# Patient Record
Sex: FEMALE | Race: WHITE | NOT HISPANIC OR LATINO | URBAN - NONMETROPOLITAN AREA
[De-identification: names, ages, dates, MRNs, and addresses within clinical notes are randomized per-mention and may not be internally consistent; named-entity substitution may affect disease eponyms.]

---

## 2017-09-20 ENCOUNTER — OFFICE VISIT (OUTPATIENT)
Dept: SURGERY | Facility: CLINIC | Age: 42
End: 2017-09-20

## 2017-09-20 VITALS
DIASTOLIC BLOOD PRESSURE: 70 MMHG | BODY MASS INDEX: 23.3 KG/M2 | HEART RATE: 82 BPM | SYSTOLIC BLOOD PRESSURE: 120 MMHG | HEIGHT: 66 IN | WEIGHT: 145 LBS

## 2017-09-20 DIAGNOSIS — R59.1 LYMPHADENOPATHY: Primary | ICD-10-CM

## 2017-09-20 PROCEDURE — 99203 OFFICE O/P NEW LOW 30 MIN: CPT | Performed by: SURGERY

## 2017-09-20 RX ORDER — CEPHALEXIN 500 MG/1
CAPSULE ORAL
COMMUNITY
Start: 2017-09-12 | End: 2017-12-18

## 2017-09-21 PROBLEM — R59.1 LYMPHADENOPATHY: Status: ACTIVE | Noted: 2017-09-21

## 2017-09-21 NOTE — PROGRESS NOTES
Subjective   Dali Woodruff is a 42 y.o. female is being seen for consultation today at the request of No ref. provider found    HPI Comments: 42-year-old female with right inguinal lymph node that is enlarged and tender to palpation.  This been present for 2 weeks despite antibiotic therapy.  No wounds or inflammation to account for enlargement.  No type B symptoms reported.  No family history of lymphoma.  On examination there is a 2 cm mildly tender right inguinal lymph node.  No other lymphadenopathy identified.      History reviewed. No pertinent past medical history.    Family History   Problem Relation Age of Onset   • Cancer Brother    • Cancer Maternal Grandmother    • Diabetes Neg Hx    • Heart disease Neg Hx    • Hypertension Neg Hx        Social History     Social History   • Marital status:      Spouse name: N/A   • Number of children: N/A   • Years of education: N/A     Occupational History   • Not on file.     Social History Main Topics   • Smoking status: Never Smoker   • Smokeless tobacco: Never Used   • Alcohol use No   • Drug use: No   • Sexual activity: Defer     Other Topics Concern   • Not on file     Social History Narrative   • No narrative on file       Past Surgical History:   Procedure Laterality Date   •  SECTION     •  SECTION     •  SECTION     • HYSTERECTOMY         The following portions of the patient's history were reviewed and updated as appropriate: allergies, current medications, past family history, past medical history, past social history, past surgical history and problem list.    Review of Systems   Constitutional: Negative for activity change, appetite change, chills and fever.   HENT: Negative for sore throat and trouble swallowing.    Eyes: Negative for visual disturbance.   Respiratory: Negative for cough and shortness of breath.    Cardiovascular: Negative for chest pain and palpitations.   Gastrointestinal: Negative for abdominal  "distention, abdominal pain, blood in stool, constipation, diarrhea, nausea and vomiting.   Endocrine: Negative for cold intolerance and heat intolerance.   Genitourinary: Negative for dysuria.   Musculoskeletal: Negative for joint swelling.   Skin: Negative for color change, rash and wound.   Allergic/Immunologic: Negative for immunocompromised state.   Neurological: Negative for dizziness, seizures, weakness and headaches.   Hematological: Positive for adenopathy. Does not bruise/bleed easily.   Psychiatric/Behavioral: Negative for agitation and confusion.         /70  Pulse 82  Ht 66\" (167.6 cm)  Wt 145 lb (65.8 kg)  LMP  (LMP Unknown)  BMI 23.4 kg/m2  Objective   Physical Exam   Constitutional: She is oriented to person, place, and time. She appears well-developed.   HENT:   Head: Normocephalic and atraumatic.   Mouth/Throat: Mucous membranes are normal.   Eyes: Conjunctivae are normal. Pupils are equal, round, and reactive to light.   Neck: Neck supple. No JVD present. No tracheal deviation present. No thyromegaly present.   Cardiovascular: Normal rate and regular rhythm.  Exam reveals no gallop and no friction rub.    No murmur heard.  Pulmonary/Chest: Effort normal and breath sounds normal.   Abdominal: Soft. She exhibits no distension. There is no splenomegaly or hepatomegaly. There is no tenderness. No hernia.   Musculoskeletal: Normal range of motion. She exhibits no deformity.   Lymphadenopathy:        Right: Inguinal adenopathy present.   Neurological: She is alert and oriented to person, place, and time.   Skin: Skin is warm and dry.   Psychiatric: She has a normal mood and affect.         Dali was seen today for enlarged lymph node of groin region.    Diagnoses and all orders for this visit:    Lymphadenopathy        Assessment     42-year-old female with right inguinal lymphadenopathy.  She had a solitary enlarged right inguinal node that is mildly tender to palpation.  She states this is " decreased in size over the past 24 hours.  She will follow up in one week and if it still enlarged will discuss surgical excision.

## 2017-11-07 DIAGNOSIS — M25.562 LEFT KNEE PAIN, UNSPECIFIED CHRONICITY: Primary | ICD-10-CM

## 2017-11-08 ENCOUNTER — APPOINTMENT (OUTPATIENT)
Dept: GENERAL RADIOLOGY | Facility: HOSPITAL | Age: 42
End: 2017-11-08
Attending: ORTHOPAEDIC SURGERY

## 2017-12-18 ENCOUNTER — OFFICE VISIT (OUTPATIENT)
Dept: RETAIL CLINIC | Facility: CLINIC | Age: 42
End: 2017-12-18

## 2017-12-18 VITALS
HEART RATE: 65 BPM | OXYGEN SATURATION: 98 % | RESPIRATION RATE: 18 BRPM | TEMPERATURE: 98 F | BODY MASS INDEX: 23.4 KG/M2 | WEIGHT: 145 LBS

## 2017-12-18 DIAGNOSIS — R31.9 URINARY TRACT INFECTION WITH HEMATURIA, SITE UNSPECIFIED: Primary | ICD-10-CM

## 2017-12-18 DIAGNOSIS — N39.0 URINARY TRACT INFECTION WITH HEMATURIA, SITE UNSPECIFIED: Primary | ICD-10-CM

## 2017-12-18 LAB
BILIRUB BLD-MCNC: NEGATIVE MG/DL
CLARITY, POC: CLEAR
COLOR UR: YELLOW
GLUCOSE UR STRIP-MCNC: NEGATIVE MG/DL
KETONES UR QL: NEGATIVE
LEUKOCYTE EST, POC: ABNORMAL
NITRITE UR-MCNC: NEGATIVE MG/ML
PH UR: 7.5 [PH] (ref 5–8)
PROT UR STRIP-MCNC: NEGATIVE MG/DL
RBC # UR STRIP: ABNORMAL /UL
SP GR UR: 1.01 (ref 1–1.03)
UROBILINOGEN UR QL: NORMAL

## 2017-12-18 PROCEDURE — 99213 OFFICE O/P EST LOW 20 MIN: CPT | Performed by: NURSE PRACTITIONER

## 2017-12-18 RX ORDER — NITROFURANTOIN 25; 75 MG/1; MG/1
100 CAPSULE ORAL 2 TIMES DAILY
Qty: 14 CAPSULE | Refills: 0 | Status: SHIPPED | OUTPATIENT
Start: 2017-12-18 | End: 2017-12-25

## 2017-12-19 NOTE — PROGRESS NOTES
Subjective   Dali Woodruff is a 42 y.o. female.   Chief Complaint   Patient presents with   • Urinary Tract Infection      HPI Comments: Dysuria, hesitancy, frequency for 2-3 days.  Hx of UTI, but none recently.  Has not seen any blood in urine.  No fever, chills, N/V/D.  Has not taken nay med for symptoms.     Urinary Tract Infection    Associated symptoms include frequency and urgency. Pertinent negatives include no chills, flank pain, hematuria, nausea or vomiting.        The following portions of the patient's history were reviewed and updated as appropriate: allergies, current medications, past family history, past medical history, past social history, past surgical history and problem list.    Current Outpatient Prescriptions:   •  Multiple Vitamin (DAILY VITAMINS PO), Take  by mouth., Disp: , Rfl:   •  nitrofurantoin, macrocrystal-monohydrate, (MACROBID) 100 MG capsule, Take 1 capsule by mouth 2 (Two) Times a Day for 7 days., Disp: 14 capsule, Rfl: 0    Review of Systems   Constitutional: Negative for appetite change, chills, fatigue and fever.   Gastrointestinal: Positive for abdominal pain (Mild suprapubic pain). Negative for diarrhea, nausea and vomiting.   Genitourinary: Positive for dysuria, frequency and urgency. Negative for flank pain, hematuria and vaginal discharge.   Musculoskeletal: Negative for back pain.   Skin: Negative for rash.     Pulse 65  Temp 98 °F (36.7 °C) (Temporal Artery )   Resp 18  Wt 65.8 kg (145 lb)  LMP Comment: Hysterectomy  SpO2 98%  BMI 23.4 kg/m2    Objective   No Known Allergies    Physical Exam   Constitutional: She appears well-developed and well-nourished. She does not appear ill. No distress.   HENT:   Head: Normocephalic and atraumatic.   Mouth/Throat: Oropharynx is clear and moist and mucous membranes are normal.   Cardiovascular: Normal rate and regular rhythm.    Pulmonary/Chest: Breath sounds normal. No respiratory distress.   Abdominal: Soft. Normal  appearance. There is tenderness (mild) in the suprapubic area. There is no CVA tenderness.   Skin: Skin is warm and dry. No rash noted.       Assessment/Plan   Dali was seen today for urinary tract infection.    Diagnoses and all orders for this visit:    Urinary tract infection with hematuria, site unspecified  -     POC Urinalysis Dipstick, Automated    Other orders  -     nitrofurantoin, macrocrystal-monohydrate, (MACROBID) 100 MG capsule; Take 1 capsule by mouth 2 (Two) Times a Day for 7 days.      Results for orders placed or performed in visit on 12/18/17   POC Urinalysis Dipstick, Automated   Result Value Ref Range    Color Yellow Yellow, Straw, Dark Yellow, Yulia    Clarity, UA Clear Clear    Glucose, UA Negative Negative, 1000 mg/dL (3+) mg/dL    Bilirubin Negative Negative    Ketones, UA Negative Negative    Specific Gravity  1.015 1.005 - 1.030    Blood, UA Trace (A) Negative    pH, Urine 7.5 5.0 - 8.0    Protein, POC Negative Negative mg/dL    Urobilinogen, UA Normal Normal    Leukocytes Small (1+) (A) Negative    Nitrite, UA Negative Negative

## 2018-11-07 ENCOUNTER — OFFICE VISIT (OUTPATIENT)
Dept: RETAIL CLINIC | Facility: CLINIC | Age: 43
End: 2018-11-07

## 2018-11-07 VITALS
HEART RATE: 83 BPM | RESPIRATION RATE: 18 BRPM | SYSTOLIC BLOOD PRESSURE: 110 MMHG | BODY MASS INDEX: 23.79 KG/M2 | TEMPERATURE: 99.1 F | DIASTOLIC BLOOD PRESSURE: 66 MMHG | OXYGEN SATURATION: 98 % | WEIGHT: 147.4 LBS

## 2018-11-07 DIAGNOSIS — J02.9 SORE THROAT: Primary | ICD-10-CM

## 2018-11-07 DIAGNOSIS — R39.15 URINARY URGENCY: ICD-10-CM

## 2018-11-07 LAB
BILIRUB BLD-MCNC: NEGATIVE MG/DL
CLARITY, POC: CLEAR
COLOR UR: YELLOW
EXPIRATION DATE: NORMAL
GLUCOSE UR STRIP-MCNC: NEGATIVE MG/DL
INTERNAL CONTROL: NORMAL
KETONES UR QL: ABNORMAL
LEUKOCYTE EST, POC: NEGATIVE
Lab: NORMAL
NITRITE UR-MCNC: NEGATIVE MG/ML
PH UR: 6 [PH] (ref 5–8)
PROT UR STRIP-MCNC: NEGATIVE MG/DL
RBC # UR STRIP: NEGATIVE /UL
S PYO AG THROAT QL: NEGATIVE
SP GR UR: 1.02 (ref 1–1.03)
UROBILINOGEN UR QL: NORMAL

## 2018-11-07 PROCEDURE — 87880 STREP A ASSAY W/OPTIC: CPT | Performed by: NURSE PRACTITIONER

## 2018-11-07 PROCEDURE — 99213 OFFICE O/P EST LOW 20 MIN: CPT | Performed by: NURSE PRACTITIONER

## 2018-11-07 RX ORDER — FLUTICASONE PROPIONATE 50 MCG
2 SPRAY, SUSPENSION (ML) NASAL DAILY
Qty: 1 BOTTLE | Refills: 0 | Status: SHIPPED | OUTPATIENT
Start: 2018-11-07 | End: 2020-01-17

## 2018-11-07 RX ORDER — PHENAZOPYRIDINE HYDROCHLORIDE 200 MG/1
200 TABLET, FILM COATED ORAL 3 TIMES DAILY PRN
Qty: 9 TABLET | Refills: 0 | Status: SHIPPED | OUTPATIENT
Start: 2018-11-07 | End: 2019-04-06

## 2018-11-07 NOTE — PROGRESS NOTES
"ZOILAGIN@  Dali Woodruff is a 43 y.o. female.   Chief Complaint   Patient presents with   • Sore Throat     Possible Strep   • Urinary Tract Infection      Sore Throat    This is a new problem. Episode onset: past few days. The problem has been waxing and waning. There has been no fever. Associated symptoms include a plugged ear sensation (congestion). Pertinent negatives include no coughing, diarrhea, ear discharge, ear pain, neck pain, shortness of breath, swollen glands, trouble swallowing or vomiting. She has had exposure to strep. She has tried nothing for the symptoms.   Urinary Tract Infection    Associated symptoms include frequency and urgency. Pertinent negatives include no chills, discharge, nausea, possible pregnancy or vomiting.      Presents with c/o of \"UTI symptoms\" last week which have improved some. But continues to have c/o of urinary urgency. She denies any dysuria today. Has a PMH of frequent UTI with last episode approximately 4 months ago.     The following portions of the patient's history were reviewed and updated as appropriate: allergies, current medications, past family history, past medical history, past social history, past surgical history and problem list.    Current Outpatient Prescriptions:   •  fluticasone (FLONASE) 50 MCG/ACT nasal spray, 2 sprays into the nostril(s) as directed by provider Daily., Disp: 1 bottle, Rfl: 0  •  loratadine-pseudoephedrine (CLARITIN-D 12 HOUR) 5-120 MG per 12 hr tablet, Take 1 tablet by mouth 2 (Two) Times a Day. PRN as needed, Disp: 14 tablet, Rfl: 0  •  Multiple Vitamin (DAILY VITAMINS PO), Take  by mouth., Disp: , Rfl:   •  phenazopyridine (PYRIDIUM) 200 MG tablet, Take 1 tablet by mouth 3 (Three) Times a Day As Needed for bladder spasms., Disp: 9 tablet, Rfl: 0    No Known Allergies    Review of Systems   Constitutional: Negative for appetite change, chills, fatigue and fever.   HENT: Positive for sore throat and voice change. Negative for " ear discharge, ear pain, postnasal drip, rhinorrhea and trouble swallowing.    Eyes: Negative for discharge.   Respiratory: Negative for cough, shortness of breath and wheezing.    Gastrointestinal: Negative for diarrhea, nausea and vomiting.   Genitourinary: Positive for frequency and urgency. Negative for decreased urine volume, dysuria and vaginal discharge.   Musculoskeletal: Negative for myalgias, neck pain and neck stiffness.   Skin: Negative for color change, pallor and rash.   Allergic/Immunologic: Negative for environmental allergies.   Neurological: Negative for dizziness.   Psychiatric/Behavioral: Negative for sleep disturbance.     Objective     Visit Vitals  /66 (BP Location: Left arm, Patient Position: Sitting, Cuff Size: Adult)   Pulse 83   Temp 99.1 °F (37.3 °C) (Oral)   Resp 18   Wt 66.9 kg (147 lb 6.4 oz)   LMP  (LMP Unknown)   SpO2 98%   BMI 23.79 kg/m²     Physical Exam   Constitutional: She appears well-developed and well-nourished.   HENT:   Head: Normocephalic.   Right Ear: Tympanic membrane is bulging. Tympanic membrane is not perforated and not erythematous. Tympanic membrane mobility is normal.   Left Ear: Tympanic membrane is bulging. Tympanic membrane is not perforated and not erythematous. Tympanic membrane mobility is normal.   Nose: Mucosal edema and rhinorrhea present.   Mouth/Throat: Posterior oropharyngeal erythema (mild) present. No oropharyngeal exudate. No tonsillar exudate.   Eyes: Pupils are equal, round, and reactive to light.   Neck: Normal range of motion.   Cardiovascular: Normal rate and regular rhythm.    Pulmonary/Chest: Effort normal and breath sounds normal.   Abdominal: Soft. Bowel sounds are normal.   Musculoskeletal: Normal range of motion.   Lymphadenopathy:     She has no cervical adenopathy.   Neurological: She is alert.   Skin: Skin is warm and dry.   Psychiatric: She has a normal mood and affect. Her behavior is normal.       Lab Results (last 24 hours)      Procedure Component Value Units Date/Time    POC Urinalysis Dipstick, Automated [[id number]]  (Abnormal) Collected:  11/07/18 1802    Specimen:  Urine Updated:  11/07/18 1803     Color Yellow     Clarity, UA Clear     Specific Gravity  1.020     pH, Urine 6.0     Leukocytes Negative     Nitrite, UA Negative     Protein, POC Negative mg/dL      Glucose, UA Negative mg/dL      Ketones, UA Trace (A)     Urobilinogen, UA Normal     Bilirubin Negative     Blood, UA Negative    POC Rapid Strep A [[id number]]  (Normal) Collected:  11/07/18 1844    Specimen:  Swab Updated:  11/07/18 1844     Rapid Strep A Screen Negative     Internal Control Passed     Lot Number [id number]     Expiration Date 02/29/2020        Assessment/Plan   Dali was seen today for sore throat and urinary tract infection.    Diagnoses and all orders for this visit:    Sore throat  -     POC Rapid Strep A  -     fluticasone (FLONASE) 50 MCG/ACT nasal spray; 2 sprays into the nostril(s) as directed by provider Daily.  -     loratadine-pseudoephedrine (CLARITIN-D 12 HOUR) 5-120 MG per 12 hr tablet; Take 1 tablet by mouth 2 (Two) Times a Day. PRN as needed    Urinary urgency  -     POC Urinalysis Dipstick, Automated  -     phenazopyridine (PYRIDIUM) 200 MG tablet; Take 1 tablet by mouth 3 (Three) Times a Day As Needed for bladder spasms.               Discussed results of the POC strep screen, negative. Discussed results of the UA and treatment plan. AVS reviewed with patient, understanding verbalized and agrees with treatment plan.  Follow up with primary care provider if no improvement within next several days. Go to [facility] or ER if condition worsens.

## 2019-02-01 ENCOUNTER — APPOINTMENT (OUTPATIENT)
Dept: CT IMAGING | Facility: HOSPITAL | Age: 44
End: 2019-02-01

## 2019-02-01 ENCOUNTER — APPOINTMENT (OUTPATIENT)
Dept: ULTRASOUND IMAGING | Facility: HOSPITAL | Age: 44
End: 2019-02-01

## 2019-02-01 ENCOUNTER — HOSPITAL ENCOUNTER (EMERGENCY)
Facility: HOSPITAL | Age: 44
Discharge: HOME | End: 2019-02-01
Attending: EMERGENCY MEDICINE | Admitting: EMERGENCY MEDICINE

## 2019-02-01 VITALS
DIASTOLIC BLOOD PRESSURE: 82 MMHG | SYSTOLIC BLOOD PRESSURE: 117 MMHG | BODY MASS INDEX: 23.14 KG/M2 | WEIGHT: 144 LBS | HEIGHT: 66 IN | RESPIRATION RATE: 18 BRPM | OXYGEN SATURATION: 98 % | TEMPERATURE: 98.4 F | HEART RATE: 72 BPM

## 2019-02-01 DIAGNOSIS — R10.31 RLQ ABDOMINAL PAIN: Primary | ICD-10-CM

## 2019-02-01 LAB
ALBUMIN SERPL-MCNC: 4.1 G/DL (ref 3.5–5)
ALBUMIN/GLOB SERPL: 1.6 G/DL (ref 1.5–2.5)
ALP SERPL-CCNC: 43 U/L (ref 35–104)
ALT SERPL W P-5'-P-CCNC: 21 U/L (ref 10–36)
ANION GAP SERPL CALCULATED.3IONS-SCNC: 6.6 MMOL/L (ref 3.6–11.2)
AST SERPL-CCNC: 23 U/L (ref 10–30)
BACTERIA UR QL AUTO: ABNORMAL /HPF
BASOPHILS # BLD AUTO: 0.01 10*3/MM3 (ref 0–0.3)
BASOPHILS NFR BLD AUTO: 0.1 % (ref 0–2)
BILIRUB SERPL-MCNC: 1.2 MG/DL (ref 0.2–1.8)
BILIRUB UR QL STRIP: NEGATIVE
BUN BLD-MCNC: 8 MG/DL (ref 7–21)
BUN/CREAT SERPL: 12.5 (ref 7–25)
CALCIUM SPEC-SCNC: 8.9 MG/DL (ref 7.7–10)
CHLORIDE SERPL-SCNC: 111 MMOL/L (ref 99–112)
CLARITY UR: ABNORMAL
CO2 SERPL-SCNC: 22.4 MMOL/L (ref 24.3–31.9)
COLOR UR: YELLOW
CREAT BLD-MCNC: 0.64 MG/DL (ref 0.43–1.29)
DEPRECATED RDW RBC AUTO: 39.6 FL (ref 37–54)
EOSINOPHIL # BLD AUTO: 0.02 10*3/MM3 (ref 0–0.7)
EOSINOPHIL NFR BLD AUTO: 0.3 % (ref 0–5)
ERYTHROCYTE [DISTWIDTH] IN BLOOD BY AUTOMATED COUNT: 12.6 % (ref 11.5–14.5)
GFR SERPL CREATININE-BSD FRML MDRD: 102 ML/MIN/1.73
GLOBULIN UR ELPH-MCNC: 2.5 GM/DL
GLUCOSE BLD-MCNC: 102 MG/DL (ref 70–110)
GLUCOSE UR STRIP-MCNC: NEGATIVE MG/DL
HCT VFR BLD AUTO: 39 % (ref 37–47)
HGB BLD-MCNC: 12.9 G/DL (ref 12–16)
HGB UR QL STRIP.AUTO: NEGATIVE
HYALINE CASTS UR QL AUTO: ABNORMAL /LPF
IMM GRANULOCYTES # BLD AUTO: 0.01 10*3/MM3 (ref 0–0.03)
IMM GRANULOCYTES NFR BLD AUTO: 0.1 % (ref 0–0.5)
KETONES UR QL STRIP: ABNORMAL
LEUKOCYTE ESTERASE UR QL STRIP.AUTO: NEGATIVE
LIPASE SERPL-CCNC: 46 U/L (ref 13–60)
LYMPHOCYTES # BLD AUTO: 0.64 10*3/MM3 (ref 1–3)
LYMPHOCYTES NFR BLD AUTO: 8.5 % (ref 21–51)
MCH RBC QN AUTO: 28.9 PG (ref 27–33)
MCHC RBC AUTO-ENTMCNC: 33.1 G/DL (ref 33–37)
MCV RBC AUTO: 87.4 FL (ref 80–94)
MONOCYTES # BLD AUTO: 0.23 10*3/MM3 (ref 0.1–0.9)
MONOCYTES NFR BLD AUTO: 3 % (ref 0–10)
NEUTROPHILS # BLD AUTO: 6.66 10*3/MM3 (ref 1.4–6.5)
NEUTROPHILS NFR BLD AUTO: 88 % (ref 30–70)
NITRITE UR QL STRIP: NEGATIVE
OSMOLALITY SERPL CALC.SUM OF ELEC: 277.9 MOSM/KG (ref 273–305)
PH UR STRIP.AUTO: >=9 [PH] (ref 5–8)
PLATELET # BLD AUTO: 148 10*3/MM3 (ref 130–400)
PMV BLD AUTO: 11.4 FL (ref 6–10)
POTASSIUM BLD-SCNC: 3.6 MMOL/L (ref 3.5–5.3)
PROT SERPL-MCNC: 6.6 G/DL (ref 6–8)
PROT UR QL STRIP: ABNORMAL
RBC # BLD AUTO: 4.46 10*6/MM3 (ref 4.2–5.4)
RBC # UR: ABNORMAL /HPF
REF LAB TEST METHOD: ABNORMAL
SODIUM BLD-SCNC: 140 MMOL/L (ref 135–153)
SP GR UR STRIP: 1.02 (ref 1–1.03)
SQUAMOUS #/AREA URNS HPF: ABNORMAL /HPF
TROPONIN I SERPL-MCNC: <0.006 NG/ML
UROBILINOGEN UR QL STRIP: ABNORMAL
WBC NRBC COR # BLD: 7.57 10*3/MM3 (ref 4.5–12.5)
WBC UR QL AUTO: ABNORMAL /HPF

## 2019-02-01 PROCEDURE — 25010000002 MORPHINE PER 10 MG: Performed by: EMERGENCY MEDICINE

## 2019-02-01 PROCEDURE — 25010000002 HYDROMORPHONE PER 4 MG: Performed by: EMERGENCY MEDICINE

## 2019-02-01 PROCEDURE — 76705 ECHO EXAM OF ABDOMEN: CPT

## 2019-02-01 PROCEDURE — 84484 ASSAY OF TROPONIN QUANT: CPT | Performed by: PHYSICIAN ASSISTANT

## 2019-02-01 PROCEDURE — 96376 TX/PRO/DX INJ SAME DRUG ADON: CPT

## 2019-02-01 PROCEDURE — 76705 ECHO EXAM OF ABDOMEN: CPT | Performed by: RADIOLOGY

## 2019-02-01 PROCEDURE — 93005 ELECTROCARDIOGRAM TRACING: CPT | Performed by: PHYSICIAN ASSISTANT

## 2019-02-01 PROCEDURE — 83690 ASSAY OF LIPASE: CPT | Performed by: PHYSICIAN ASSISTANT

## 2019-02-01 PROCEDURE — 76857 US EXAM PELVIC LIMITED: CPT | Performed by: RADIOLOGY

## 2019-02-01 PROCEDURE — 25010000002 ONDANSETRON PER 1 MG: Performed by: EMERGENCY MEDICINE

## 2019-02-01 PROCEDURE — 93010 ELECTROCARDIOGRAM REPORT: CPT | Performed by: INTERNAL MEDICINE

## 2019-02-01 PROCEDURE — 96375 TX/PRO/DX INJ NEW DRUG ADDON: CPT

## 2019-02-01 PROCEDURE — 74176 CT ABD & PELVIS W/O CONTRAST: CPT | Performed by: RADIOLOGY

## 2019-02-01 PROCEDURE — 25010000002 HYDROMORPHONE 1 MG/ML SOLUTION: Performed by: EMERGENCY MEDICINE

## 2019-02-01 PROCEDURE — 80053 COMPREHEN METABOLIC PANEL: CPT | Performed by: PHYSICIAN ASSISTANT

## 2019-02-01 PROCEDURE — 81001 URINALYSIS AUTO W/SCOPE: CPT | Performed by: PHYSICIAN ASSISTANT

## 2019-02-01 PROCEDURE — 96374 THER/PROPH/DIAG INJ IV PUSH: CPT

## 2019-02-01 PROCEDURE — 85025 COMPLETE CBC W/AUTO DIFF WBC: CPT | Performed by: PHYSICIAN ASSISTANT

## 2019-02-01 PROCEDURE — 99283 EMERGENCY DEPT VISIT LOW MDM: CPT

## 2019-02-01 PROCEDURE — 74176 CT ABD & PELVIS W/O CONTRAST: CPT

## 2019-02-01 PROCEDURE — 25010000002 PROMETHAZINE PER 50 MG: Performed by: PHYSICIAN ASSISTANT

## 2019-02-01 PROCEDURE — 76856 US EXAM PELVIC COMPLETE: CPT

## 2019-02-01 PROCEDURE — 25010000002 ONDANSETRON PER 1 MG: Performed by: PHYSICIAN ASSISTANT

## 2019-02-01 RX ORDER — SODIUM CHLORIDE 0.9 % (FLUSH) 0.9 %
10 SYRINGE (ML) INJECTION AS NEEDED
Status: DISCONTINUED | OUTPATIENT
Start: 2019-02-01 | End: 2019-02-01 | Stop reason: HOSPADM

## 2019-02-01 RX ORDER — KETOROLAC TROMETHAMINE 10 MG/1
10 TABLET, FILM COATED ORAL EVERY 6 HOURS PRN
Qty: 10 TABLET | Refills: 0 | Status: SHIPPED | OUTPATIENT
Start: 2019-02-01 | End: 2019-04-06

## 2019-02-01 RX ORDER — PROMETHAZINE HYDROCHLORIDE 25 MG/ML
12.5 INJECTION, SOLUTION INTRAMUSCULAR; INTRAVENOUS ONCE
Status: COMPLETED | OUTPATIENT
Start: 2019-02-01 | End: 2019-02-01

## 2019-02-01 RX ORDER — ONDANSETRON 2 MG/ML
4 INJECTION INTRAMUSCULAR; INTRAVENOUS ONCE
Status: COMPLETED | OUTPATIENT
Start: 2019-02-01 | End: 2019-02-01

## 2019-02-01 RX ORDER — HYDROCODONE BITARTRATE AND ACETAMINOPHEN 5; 325 MG/1; MG/1
1 TABLET ORAL EVERY 6 HOURS PRN
Qty: 10 TABLET | Refills: 0 | Status: SHIPPED | OUTPATIENT
Start: 2019-02-01 | End: 2019-04-06

## 2019-02-01 RX ORDER — HYDROMORPHONE HYDROCHLORIDE 1 MG/ML
0.5 INJECTION, SOLUTION INTRAMUSCULAR; INTRAVENOUS; SUBCUTANEOUS ONCE
Status: COMPLETED | OUTPATIENT
Start: 2019-02-01 | End: 2019-02-01

## 2019-02-01 RX ORDER — ONDANSETRON 4 MG/1
4 TABLET, FILM COATED ORAL EVERY 6 HOURS PRN
Qty: 15 TABLET | Refills: 0 | Status: SHIPPED | OUTPATIENT
Start: 2019-02-01 | End: 2019-04-06

## 2019-02-01 RX ADMIN — SODIUM CHLORIDE 1000 ML: 9 INJECTION, SOLUTION INTRAVENOUS at 10:48

## 2019-02-01 RX ADMIN — ONDANSETRON 4 MG: 2 INJECTION, SOLUTION INTRAMUSCULAR; INTRAVENOUS at 11:29

## 2019-02-01 RX ADMIN — HYDROMORPHONE HYDROCHLORIDE 0.5 MG: 1 INJECTION, SOLUTION INTRAMUSCULAR; INTRAVENOUS; SUBCUTANEOUS at 10:55

## 2019-02-01 RX ADMIN — HYDROMORPHONE HYDROCHLORIDE 1 MG: 1 INJECTION, SOLUTION INTRAMUSCULAR; INTRAVENOUS; SUBCUTANEOUS at 11:24

## 2019-02-01 RX ADMIN — ONDANSETRON 4 MG: 2 INJECTION, SOLUTION INTRAMUSCULAR; INTRAVENOUS at 10:48

## 2019-02-01 RX ADMIN — PROMETHAZINE HYDROCHLORIDE 12.5 MG: 25 INJECTION INTRAMUSCULAR; INTRAVENOUS at 12:32

## 2019-02-01 NOTE — ED PROVIDER NOTES
Subjective   43-year-old white female presents secondary to abdominal pain.  This is in her right mid to lower abdomen.  This started just prior to arrival and has been sharp.  She is had nausea due to pain.  No fever.  No dysuria.  No blood in her urine.  She states that she had a similar episode of pain ago that this was short-lived and resolved spontaneously.  No chest pain.  No shortness of breath.  No diarrhea.  She voices no other complaints at this time.            Review of Systems   Constitutional: Negative.  Negative for fever.   HENT: Negative.    Respiratory: Negative.    Cardiovascular: Negative.  Negative for chest pain.   Gastrointestinal: Positive for abdominal pain and nausea.   Endocrine: Negative.    Genitourinary: Negative.  Negative for dysuria.   Skin: Negative.    Neurological: Negative.    Psychiatric/Behavioral: Negative.    All other systems reviewed and are negative.      Past Medical History:   Diagnosis Date   • Urinary tract infection        No Known Allergies    Past Surgical History:   Procedure Laterality Date   •  SECTION     •  SECTION     •  SECTION     • HYSTERECTOMY         Family History   Problem Relation Age of Onset   • Cancer Brother    • Cancer Maternal Grandmother    • Diabetes Neg Hx    • Heart disease Neg Hx    • Hypertension Neg Hx        Social History     Socioeconomic History   • Marital status:      Spouse name: Not on file   • Number of children: Not on file   • Years of education: Not on file   • Highest education level: Not on file   Tobacco Use   • Smoking status: Former Smoker     Last attempt to quit: 2000     Years since quittin.0   • Smokeless tobacco: Never Used   Substance and Sexual Activity   • Alcohol use: No   • Drug use: No   • Sexual activity: Defer           Objective   Physical Exam   Constitutional: She is oriented to person, place, and time. She appears well-developed and well-nourished. No distress.   HENT:    Head: Normocephalic and atraumatic.   Right Ear: External ear normal.   Left Ear: External ear normal.   Nose: Nose normal.   Eyes: Conjunctivae and EOM are normal. Pupils are equal, round, and reactive to light.   Neck: Normal range of motion. Neck supple. No JVD present. No tracheal deviation present.   Cardiovascular: Normal rate, regular rhythm and normal heart sounds.   No murmur heard.  Pulmonary/Chest: Effort normal and breath sounds normal. No respiratory distress. She has no wheezes.   Abdominal: Soft. Bowel sounds are normal. There is tenderness (right mid abd to lower quadrant). There is no rebound and no guarding.   Musculoskeletal: Normal range of motion. She exhibits no edema or deformity.   Neurological: She is alert and oriented to person, place, and time. No cranial nerve deficit.   Skin: Skin is warm and dry. No rash noted. She is not diaphoretic. No erythema. No pallor.   Psychiatric: She has a normal mood and affect. Her behavior is normal. Thought content normal.   Nursing note and vitals reviewed.      Procedures           ED Course  ED Course as of Feb 01 1605   Fri Feb 01, 2019   1604 At dispo, patient eval with Dr Corado.  She is feeling much better at this time.  Her pain is resolved.  No further nausea vomiting.  She was given the option of being admitted for observation versus being treateat home.  She preferred to be treated at home.  She's been counseled about signs and symptoms worsening along with appropriate follow-up.  She voices understanding.  [JI]      ED Course User Index  [JI] Teofilo Plata PA                  MDM  Number of Diagnoses or Management Options  RLQ abdominal pain: new and requires workup     Amount and/or Complexity of Data Reviewed  Clinical lab tests: reviewed and ordered  Tests in the radiology section of CPT®: ordered and reviewed  Independent visualization of images, tracings, or specimens: yes    Risk of Complications, Morbidity, and/or  Mortality  Presenting problems: moderate          Final diagnoses:   RLQ abdominal pain            Teofilo Plata PA  02/01/19 1604       Teofilo Plata PA  02/01/19 1603

## 2019-04-06 ENCOUNTER — OFFICE VISIT (OUTPATIENT)
Dept: RETAIL CLINIC | Facility: CLINIC | Age: 44
End: 2019-04-06

## 2019-04-06 VITALS
RESPIRATION RATE: 20 BRPM | WEIGHT: 150.6 LBS | HEART RATE: 87 BPM | TEMPERATURE: 99.2 F | HEIGHT: 66 IN | OXYGEN SATURATION: 99 % | BODY MASS INDEX: 24.2 KG/M2

## 2019-04-06 DIAGNOSIS — N30.00 ACUTE CYSTITIS WITHOUT HEMATURIA: Primary | ICD-10-CM

## 2019-04-06 LAB
BILIRUB BLD-MCNC: ABNORMAL MG/DL
CLARITY, POC: CLEAR
COLOR UR: ABNORMAL
GLUCOSE UR STRIP-MCNC: NEGATIVE MG/DL
KETONES UR QL: NEGATIVE
LEUKOCYTE EST, POC: ABNORMAL
NITRITE UR-MCNC: POSITIVE MG/ML
PH UR: 6 [PH] (ref 5–8)
PROT UR STRIP-MCNC: NEGATIVE MG/DL
RBC # UR STRIP: NEGATIVE /UL
SP GR UR: 1.03 (ref 1–1.03)
UROBILINOGEN UR QL: ABNORMAL

## 2019-04-06 PROCEDURE — 99213 OFFICE O/P EST LOW 20 MIN: CPT | Performed by: NURSE PRACTITIONER

## 2019-04-06 RX ORDER — PHENAZOPYRIDINE HYDROCHLORIDE 200 MG/1
200 TABLET, FILM COATED ORAL 3 TIMES DAILY PRN
Qty: 9 TABLET | Refills: 0 | Status: SHIPPED | OUTPATIENT
Start: 2019-04-06 | End: 2019-04-08

## 2019-04-06 RX ORDER — NITROFURANTOIN 25; 75 MG/1; MG/1
100 CAPSULE ORAL 2 TIMES DAILY
Qty: 14 CAPSULE | Refills: 0 | Status: SHIPPED | OUTPATIENT
Start: 2019-04-06 | End: 2019-04-13

## 2019-04-06 NOTE — PATIENT INSTRUCTIONS
Urinary Frequency, Adult  Urinary frequency means urinating more often than usual. People with urinary frequency urinate at least 8 times in 24 hours, even if they drink a normal amount of fluid. Although they urinate more often than normal, the total amount of urine produced in a day may be normal. Urinary frequency is also called pollakiuria.  What are the causes?  This condition may be caused by:  · A urinary tract infection.  · Obesity.  · Bladder problems, such as bladder stones.  · Caffeine or alcohol.  · Eating food or drinking fluids that irritate the bladder. These include coffee, tea, soda, artificial sweeteners, citrus, tomato-based foods, and chocolate.  · Certain medicines, such as medicines that help the body get rid of extra fluid (diuretics).  · Muscle or nerve weakness.  · Overactive bladder.  · Chronic diabetes.  · Interstitial cystitis.  · In men, problems with the prostate, such as an enlarged prostate.  · In women, pregnancy.    In some cases, the cause may not be known.  What increases the risk?  This condition is more likely to develop in:  · Women who have gone through menopause.  · Men with prostate problems.  · People with a disease or injury that affects the nerves or spinal cord.  · People who have or have had a condition that affects the brain, such as a stroke.    What are the signs or symptoms?  Symptoms of this condition include:  · Feeling an urgent need to urinate often. The stress and anxiety of needing to find a bathroom quickly can make this urge worse.  · Urinating 8 or more times in 24 hours.  · Urinating as often as every 1 to 2 hours.    How is this diagnosed?  This condition is diagnosed based on your symptoms, your medical history, and a physical exam. You may have tests, such as:  · Blood tests.  · Urine tests.  · Imaging tests, such as X-rays or ultrasounds.  · A bladder test.  · A test of your neurological system. This is the body system that senses the need to  urinate.  · A test to check for problems in the urethra and bladder called cystoscopy.    You may also be asked to keep a bladder diary. A bladder diary is a record of what you eat and drink, how often you urinate, and how much you urinate. You may need to see a health care provider who specializes in conditions of the urinary tract (urologist) or kidneys (nephrologist).  How is this treated?  Treatment for this condition depends on the cause. Sometimes the condition goes away on its own and treatment is not necessary. If treatment is needed, it may include:  · Taking medicine.  · Learning exercises that strengthen the muscles that help control urination.  · Following a bladder training program. This may include:  ? Learning to delay going to the bathroom.  ? Double urinating (voiding). This helps if you are not completely emptying your bladder.  ? Scheduled voiding.  · Making diet changes, such as:  ? Avoiding caffeine.  ? Drinking fewer fluids, especially alcohol.  ? Not drinking in the evening.  ? Not having foods or drinks that may irritate the bladder.  ? Eating foods that help prevent or ease constipation. Constipation can make this condition worse.  · Having the nerves in your bladder stimulated. There are two options for stimulating the nerves to your bladder:  ? Outpatient electrical nerve stimulation. This is done by your health care provider.  ? Surgery to implant a bladder pacemaker. The pacemaker helps to control the urge to urinate.    Follow these instructions at home:  · Keep a bladder diary if told to by your health care provider.  · Take over-the-counter and prescription medicines only as told by your health care provider.  · Do any exercises as told by your health care provider.  · Follow a bladder training program as told by your health care provider.  · Make any recommended diet changes.  · Keep all follow-up visits as told by your health care provider. This is important.  Contact a health care  provider if:  · You start urinating more often.  · You feel pain or irritation when you urinate.  · You notice blood in your urine.  · Your urine looks cloudy.  · You develop a fever.  · You begin vomiting.  Get help right away if:  · You are unable to urinate.  This information is not intended to replace advice given to you by your health care provider. Make sure you discuss any questions you have with your health care provider.  Document Released: 10/14/2010 Document Revised: 01/18/2017 Document Reviewed: 07/13/2016  [employer] Interactive Patient Education © 2019 [employer]

## 2019-04-06 NOTE — PROGRESS NOTES
SUBJECTIVEBEGIN@  Dali Woodruff is a 44 y.o. female.   Chief Complaint   Patient presents with   • Urinary Tract Infection      Urinary Tract Infection    This is a new problem. The current episode started today. The problem has been gradually worsening. The quality of the pain is described as aching. The pain is mild. There has been no fever. Associated symptoms include frequency and urgency. Pertinent negatives include no chills, discharge, flank pain, hematuria, nausea, possible pregnancy or vomiting. Treatments tried: took a Macrobid and Pyridium (only had One of each) The treatment provided mild relief. Her past medical history is significant for recurrent UTIs.        Dali Woodruff  presents to [facility] with cc of UTI. Reviewed the PMFSH. See ROS.  The following portions of the patient's history were reviewed and updated as appropriate: allergies, current medications, past family history, past medical history, past social history, past surgical history and problem list.    Current Outpatient Medications:   •  Multiple Vitamin (DAILY VITAMINS PO), Take  by mouth., Disp: , Rfl:   •  fluticasone (FLONASE) 50 MCG/ACT nasal spray, 2 sprays into the nostril(s) as directed by provider Daily., Disp: 1 bottle, Rfl: 0  •  loratadine-pseudoephedrine (CLARITIN-D 12 HOUR) 5-120 MG per 12 hr tablet, Take 1 tablet by mouth 2 (Two) Times a Day. PRN as needed, Disp: 14 tablet, Rfl: 0  •  nitrofurantoin, macrocrystal-monohydrate, (MACROBID) 100 MG capsule, Take 1 capsule by mouth 2 (Two) Times a Day for 7 days., Disp: 14 capsule, Rfl: 0  •  phenazopyridine (PYRIDIUM) 200 MG tablet, Take 1 tablet by mouth 3 (Three) Times a Day As Needed for bladder spasms for up to 2 days., Disp: 9 tablet, Rfl: 0    No Known Allergies    Review of Systems   Constitutional: Negative for chills.   Gastrointestinal: Positive for abdominal pain (suprapubic tenderness). Negative for abdominal distention, nausea and vomiting.   Endocrine: Negative for  "cold intolerance.   Genitourinary: Positive for frequency and urgency. Negative for flank pain, hematuria and vaginal discharge.   Skin: Negative for pallor.   Allergic/Immunologic: Negative.  Negative for environmental allergies, food allergies and immunocompromised state.   Hematological: Negative for adenopathy.       Objective     Visit Vitals  Pulse 87   Temp 99.2 °F (37.3 °C) (Temporal)   Resp 20   Ht 167.6 cm (66\")   Wt 68.3 kg (150 lb 9.6 oz)   LMP  (LMP Unknown)   SpO2 99%   BMI 24.31 kg/m²         Physical Exam   Constitutional: She is oriented to person, place, and time. She appears well-developed and well-nourished. No distress.   HENT:   Head: Normocephalic and atraumatic.   Mouth/Throat: Oropharynx is clear and moist and mucous membranes are normal.   Eyes: Conjunctivae and EOM are normal. Pupils are equal, round, and reactive to light.   Neck: Normal range of motion. Neck supple.   Cardiovascular: Normal rate, regular rhythm and normal heart sounds.   Pulmonary/Chest: Effort normal and breath sounds normal.   Abdominal: Soft. Bowel sounds are normal. She exhibits no distension and no mass. There is tenderness (suprapubic ) in the suprapubic area. There is no rebound and no guarding.   Musculoskeletal: Normal range of motion. She exhibits no tenderness.   Lymphadenopathy:     She has no cervical adenopathy.   Neurological: She is alert and oriented to person, place, and time.   Skin: Skin is warm and dry. No rash noted.   Psychiatric: She has a normal mood and affect. Her behavior is normal. Judgment and thought content normal.   Nursing note and vitals reviewed.      Lab Results (last 24 hours)     ** No results found for the last 24 hours. **          Assessment/Plan   Dali was seen today for urinary tract infection.    Diagnoses and all orders for this visit:    Acute cystitis without hematuria  -     POC Urinalysis Dipstick, Automated  -     nitrofurantoin, macrocrystal-monohydrate, (MACROBID) " 100 MG capsule; Take 1 capsule by mouth 2 (Two) Times a Day for 7 days.  -     phenazopyridine (PYRIDIUM) 200 MG tablet; Take 1 tablet by mouth 3 (Three) Times a Day As Needed for bladder spasms for up to 2 days.  -     Urine Culture - Urine, Urine, Clean Catch

## 2019-04-11 LAB
BACTERIA UR CULT: ABNORMAL
BACTERIA UR CULT: ABNORMAL
OTHER ANTIBIOTIC SUSC ISLT: ABNORMAL

## 2019-04-15 ENCOUNTER — OFFICE VISIT (OUTPATIENT)
Dept: UROLOGY | Facility: CLINIC | Age: 44
End: 2019-04-15

## 2019-04-15 ENCOUNTER — TELEPHONE (OUTPATIENT)
Dept: UROLOGY | Facility: CLINIC | Age: 44
End: 2019-04-15

## 2019-04-15 VITALS — WEIGHT: 145 LBS | BODY MASS INDEX: 23.3 KG/M2 | HEIGHT: 66 IN

## 2019-04-15 DIAGNOSIS — N30.20 CHRONIC CYSTITIS: ICD-10-CM

## 2019-04-15 DIAGNOSIS — R35.0 FREQUENCY OF URINATION: Primary | ICD-10-CM

## 2019-04-15 LAB
BILIRUB BLD-MCNC: NEGATIVE MG/DL
CLARITY, POC: CLEAR
COLOR UR: YELLOW
GLUCOSE UR STRIP-MCNC: NEGATIVE MG/DL
KETONES UR QL: NEGATIVE
LEUKOCYTE EST, POC: NEGATIVE
NITRITE UR-MCNC: NEGATIVE MG/ML
PH UR: 6 [PH] (ref 5–8)
PROT UR STRIP-MCNC: NEGATIVE MG/DL
RBC # UR STRIP: NEGATIVE /UL
SP GR UR: 1.01 (ref 1–1.03)
UROBILINOGEN UR QL: NORMAL

## 2019-04-15 PROCEDURE — 99203 OFFICE O/P NEW LOW 30 MIN: CPT | Performed by: UROLOGY

## 2019-04-15 PROCEDURE — 51798 US URINE CAPACITY MEASURE: CPT | Performed by: UROLOGY

## 2019-04-15 RX ORDER — NITROFURANTOIN 25; 75 MG/1; MG/1
100 CAPSULE ORAL DAILY
Qty: 56 CAPSULE | Refills: 3 | Status: SHIPPED | OUTPATIENT
Start: 2019-04-15 | End: 2020-01-17

## 2019-04-15 NOTE — TELEPHONE ENCOUNTER
The patient called and said that she forgot to ask Dr. Monterroso if she could have intercourse while taking the antibiotics. I talked to him and he said yes she could.

## 2019-04-15 NOTE — PROGRESS NOTES
Chief Complaint:          Chief Complaint   Patient presents with   • Recurrent Uti     New Patient        HPI:   44 y.o. female.  44-year-old white female with recurrent urinary tract infections every 1-2 months never had a mental 15 years ago then 6 months then now sooner with the infection she has sudden urgency pressure, no dysuria no stress incontinence clearly related to sexual intercourse she has normal bowel movements.  She has been on Macrodantin intermittently.  She does drink tea and coffee.  She has a history of a urethral diverticulum done in [place] at the time of a hysterectomy.  She is a  4 para 4.  She has normal bowel movements she gets up one time at night.  Most recently she has had multiple rounds of antibiotics including Rocephin.    Past Medical History:        Past Medical History:   Diagnosis Date   • Urinary tract infection          Current Meds:     Current Outpatient Medications   Medication Sig Dispense Refill   • fluticasone (FLONASE) 50 MCG/ACT nasal spray 2 sprays into the nostril(s) as directed by provider Daily. 1 bottle 0   • loratadine-pseudoephedrine (CLARITIN-D 12 HOUR) 5-120 MG per 12 hr tablet Take 1 tablet by mouth 2 (Two) Times a Day. PRN as needed 14 tablet 0   • Multiple Vitamin (DAILY VITAMINS PO) Take  by mouth.       No current facility-administered medications for this visit.         Allergies:      No Known Allergies     Past Surgical History:     Past Surgical History:   Procedure Laterality Date   •  SECTION      male   •  SECTION      male   •  SECTION  2004    female   • HYSTERECTOMY      partial, repair on urethra   • VAGINAL DELIVERY      male         Social History:     Social History     Socioeconomic History   • Marital status:      Spouse name: Not on file   • Number of children: Not on file   • Years of education: Not on file   • Highest education level: Not on file   Tobacco Use   • Smoking status:  Former Smoker     Last attempt to quit: 2000     Years since quittin.2   • Smokeless tobacco: Never Used   Substance and Sexual Activity   • Alcohol use: No   • Drug use: No   • Sexual activity: Defer     Birth control/protection: Surgical     Comment: , has 4 children, 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  Negative for activity change, appetite change, chills, diaphoresis, fatigue and unexpected weight change.   HENT: Negative for congestion, dental problem, drooling, ear discharge, ear pain, facial swelling, hearing loss, mouth sores, nosebleeds, postnasal drip, rhinorrhea, sinus pressure, sneezing, sore throat, tinnitus, trouble swallowing and voice change.    Eyes: Negative.  Negative for photophobia, pain, discharge, redness, itching and visual disturbance.   Respiratory: Negative.  Negative for apnea, cough, choking, chest tightness, shortness of breath, wheezing and stridor.    Cardiovascular: Negative.  Negative for chest pain, palpitations and leg swelling.   Gastrointestinal: Negative.  Negative for abdominal distention, abdominal pain, anal bleeding, blood in stool, constipation, diarrhea, nausea, rectal pain and vomiting.   Endocrine: Negative.  Negative for cold intolerance, heat intolerance, polydipsia, polyphagia and polyuria.   Musculoskeletal: Negative.  Negative for arthralgias, back pain, gait problem, joint swelling, myalgias, neck pain and neck stiffness.   Skin: Negative.  Negative for color change, pallor, rash and wound.   Allergic/Immunologic: Negative.  Negative for environmental allergies, food allergies and immunocompromised state.   Neurological: Negative.  Negative for dizziness, tremors,  seizures, syncope, facial asymmetry, speech difficulty, weakness, light-headedness, numbness and headaches.   Hematological: Negative.  Negative for adenopathy. Does not bruise/bleed easily.   Psychiatric/Behavioral: Negative for agitation, behavioral problems, confusion, decreased concentration, dysphoric mood, hallucinations, self-injury, sleep disturbance and suicidal ideas. The patient is not nervous/anxious and is not hyperactive.    All other systems reviewed and are negative.      Physical Exam:     Physical Exam   Constitutional: She appears well-developed and well-nourished.   HENT:   Head: Normocephalic and atraumatic.   Right Ear: External ear normal.   Left Ear: External ear normal.   Mouth/Throat: Oropharynx is clear and moist.   Eyes: Conjunctivae are normal. Pupils are equal, round, and reactive to light.   Cardiovascular: Normal rate, regular rhythm, normal heart sounds and intact distal pulses.   Pulmonary/Chest: Effort normal and breath sounds normal.   Abdominal: Soft. Bowel sounds are normal. She exhibits no distension and no mass. There is no tenderness. There is no rebound and no guarding.   Genitourinary: No vaginal discharge found.   Genitourinary Comments: Soft nontender abdomen with no organomegaly, rigidity, guarding or tenderness.  Normal vaginal orifice.  No evidence of prolapse no palpable masses.  No significant perineal body abnormalities and a normal external anus.  Neurologic exam is nonfocal.   Musculoskeletal: Normal range of motion.   Neurological: She is alert. She has normal reflexes.   Skin: Skin is warm and dry.   Psychiatric: She has a normal mood and affect. Her behavior is normal. Judgment and thought content normal.       I have reviewed the following portions of the patient's history: allergies, current medications, past family history, past medical history, past social history, past surgical history, problem list and ROS and confirm it's accurate.      Procedure:        Assessment/Plan:   Recurrent urinary tract infections-patient has been referred and diagnosed with recurrent urinary tract infections.  We discussed the types of organisms that are found in the urinary tract indicating that the vast majority are results of the patient's own gastrointestinal analilia.  We discussed how many of the antibiotics that are utilized can actually exacerbate these infections by creating resistant organisms and there is only a very few antibiotics that are concentrated in the urine and do not affect the rectal reservoir nor cause recurrent yeast vaginitis.  We discussed the risk factors for recurrent infections being intercourse in younger patients and atrophic changes in older patients.  We discussed the symptoms that are found including pain, pressure, burning, frequency, urgency suprapubic pain and painful intercourse.  I discussed upper tract symptoms including fevers, chills, and indicated the workup would be much more aggressive if the patient were to present with recurrent infections in the face of upper tract symptomatology such as fever.  I discussed the history of vesicoureteral reflux in young patients and finally chronic renal scarring as a result of such.  I recommend concomitant probiotics with treatment with antibiotics to protect the rectal reservoir including over-the-counter yogurt preparations to mathieu oral pills containing the appropriate probiotics.  Detrusor instability-resulting in urinary frequency    Patient's Body mass index is 23.4 kg/m². BMI is within normal parameters. No follow-up required..          This document has been electronically signed by TRISTAN MOORE MD April 15, 2019 9:20 AM

## 2019-04-17 PROBLEM — N30.20 CHRONIC CYSTITIS: Status: ACTIVE | Noted: 2019-04-17

## 2019-06-10 ENCOUNTER — OFFICE VISIT (OUTPATIENT)
Dept: UROLOGY | Facility: CLINIC | Age: 44
End: 2019-06-10

## 2019-06-10 VITALS — WEIGHT: 145 LBS | HEIGHT: 66 IN | BODY MASS INDEX: 23.3 KG/M2

## 2019-06-10 DIAGNOSIS — N30.20 CHRONIC CYSTITIS: ICD-10-CM

## 2019-06-10 DIAGNOSIS — R35.0 FREQUENCY OF URINATION: Primary | ICD-10-CM

## 2019-06-10 LAB
BILIRUB BLD-MCNC: NEGATIVE MG/DL
CLARITY, POC: CLEAR
COLOR UR: YELLOW
GLUCOSE UR STRIP-MCNC: NEGATIVE MG/DL
KETONES UR QL: NEGATIVE
LEUKOCYTE EST, POC: NEGATIVE
NITRITE UR-MCNC: NEGATIVE MG/ML
PH UR: 6 [PH] (ref 5–8)
PROT UR STRIP-MCNC: NEGATIVE MG/DL
RBC # UR STRIP: NEGATIVE /UL
SP GR UR: 1 (ref 1–1.03)
UROBILINOGEN UR QL: NORMAL

## 2019-06-10 PROCEDURE — 99213 OFFICE O/P EST LOW 20 MIN: CPT | Performed by: UROLOGY

## 2019-06-10 NOTE — PROGRESS NOTES
Chief Complaint:          Chief Complaint   Patient presents with   • Urinary Tract Infection     8 week follow up        HPI:   44 y.o. female with recurrent urinary tract infections every 1-2 months never had a mental 15 years ago then 6 months then now sooner with the infection she has sudden urgency pressure, no dysuria no stress incontinence clearly related to sexual intercourse she has normal bowel movements.  She has been on Macrodantin intermittently.  She does drink tea and coffee.  She has a history of a urethral diverticulum done in [place] at the time of a hysterectomy.  She is a  4 para 4.  She has normal bowel movements she gets up one time at night.  Most recently she has had multiple rounds of antibiotics including Rocephin.  She returns today her bowel movements are normal she is on probiotics her urine is negative she is doing great she will wean herself off the Macrobid we will see her back on an as needed basis      Past Medical History:        Past Medical History:   Diagnosis Date   • Urinary tract infection          Current Meds:     Current Outpatient Medications   Medication Sig Dispense Refill   • fluticasone (FLONASE) 50 MCG/ACT nasal spray 2 sprays into the nostril(s) as directed by provider Daily. 1 bottle 0   • loratadine-pseudoephedrine (CLARITIN-D 12 HOUR) 5-120 MG per 12 hr tablet Take 1 tablet by mouth 2 (Two) Times a Day. PRN as needed 14 tablet 0   • Multiple Vitamin (DAILY VITAMINS PO) Take  by mouth.     • nitrofurantoin, macrocrystal-monohydrate, (MACROBID) 100 MG capsule Take 1 capsule by mouth Daily. 56 capsule 3     No current facility-administered medications for this visit.         Allergies:      No Known Allergies     Past Surgical History:     Past Surgical History:   Procedure Laterality Date   •  SECTION      male   •  SECTION      male   •  SECTION  2004    female   • HYSTERECTOMY  2012    partial, repair on urethra   • VAGINAL  DELIVERY      male         Social History:     Social History     Socioeconomic History   • Marital status:      Spouse name: Not on file   • Number of children: Not on file   • Years of education: Not on file   • Highest education level: Not on file   Tobacco Use   • Smoking status: Former Smoker     Last attempt to quit: 2000     Years since quittin.4   • Smokeless tobacco: Never Used   Substance and Sexual Activity   • Alcohol use: No   • Drug use: No   • Sexual activity: Defer     Birth control/protection: Surgical     Comment: , has 4 children, 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  Negative for activity change, appetite change, chills, diaphoresis, fatigue and unexpected weight change.   HENT: Negative for congestion, dental problem, drooling, ear discharge, ear pain, facial swelling, hearing loss, mouth sores, nosebleeds, postnasal drip, rhinorrhea, sinus pressure, sneezing, sore throat, tinnitus, trouble swallowing and voice change.    Eyes: Negative.  Negative for photophobia, pain, discharge, redness, itching and visual disturbance.   Respiratory: Negative.  Negative for apnea, cough, choking, chest tightness, shortness of breath, wheezing and stridor.    Cardiovascular: Negative.  Negative for chest pain, palpitations and leg swelling.   Gastrointestinal: Negative.  Negative for abdominal distention, abdominal pain, anal bleeding, blood in stool, constipation, diarrhea, nausea, rectal pain and vomiting.   Endocrine: Negative.  Negative for cold intolerance, heat intolerance, polydipsia, polyphagia and polyuria.   Musculoskeletal: Negative.  Negative for arthralgias, back pain, gait problem, joint  swelling, myalgias, neck pain and neck stiffness.   Skin: Negative.  Negative for color change, pallor, rash and wound.   Allergic/Immunologic: Negative.  Negative for environmental allergies, food allergies and immunocompromised state.   Neurological: Negative.  Negative for dizziness, tremors, seizures, syncope, facial asymmetry, speech difficulty, weakness, light-headedness, numbness and headaches.   Hematological: Negative.  Negative for adenopathy. Does not bruise/bleed easily.   Psychiatric/Behavioral: Negative for agitation, behavioral problems, confusion, decreased concentration, dysphoric mood, hallucinations, self-injury, sleep disturbance and suicidal ideas. The patient is not nervous/anxious and is not hyperactive.    All other systems reviewed and are negative.      Physical Exam:     Physical Exam   Constitutional: She appears well-developed and well-nourished.   HENT:   Head: Normocephalic and atraumatic.   Right Ear: External ear normal.   Left Ear: External ear normal.   Mouth/Throat: Oropharynx is clear and moist.   Eyes: Conjunctivae are normal. Pupils are equal, round, and reactive to light.   Cardiovascular: Normal rate, regular rhythm, normal heart sounds and intact distal pulses.   Pulmonary/Chest: Effort normal and breath sounds normal.   Abdominal: Soft. Bowel sounds are normal. She exhibits no distension and no mass. There is no tenderness. There is no rebound and no guarding.   Genitourinary: No vaginal discharge found.   Musculoskeletal: Normal range of motion.   Neurological: She is alert. She has normal reflexes.   Skin: Skin is warm and dry.   Psychiatric: She has a normal mood and affect. Her behavior is normal. Judgment and thought content normal.       I have reviewed the following portions of the patient's history: allergies, current medications, past family history, past medical history, past social history, past surgical history, problem list and ROS and confirm it's  accurate.      Procedure:       Assessment/Plan:   Recurrent urinary tract infections-patient has been referred and diagnosed with recurrent urinary tract infections.  We discussed the types of organisms that are found in the urinary tract indicating that the vast majority are results of the patient's own gastrointestinal analilia.  We discussed how many of the antibiotics that are utilized can actually exacerbate these infections by creating resistant organisms and there is only a very few antibiotics that are concentrated in the urine and do not affect the rectal reservoir nor cause recurrent yeast vaginitis.  We discussed the risk factors for recurrent infections being intercourse in younger patients and atrophic changes in older patients.  We discussed the symptoms that are found including pain, pressure, burning, frequency, urgency suprapubic pain and painful intercourse.  I discussed upper tract symptoms including fevers, chills, and indicated the workup would be much more aggressive if the patient were to present with recurrent infections in the face of upper tract symptomatology such as fever.  I discussed the history of vesicoureteral reflux in young patients and finally chronic renal scarring as a result of such.  I recommend concomitant probiotics with treatment with antibiotics to protect the rectal reservoir including over-the-counter yogurt preparations to mathieu oral pills containing the appropriate probiotics.  She is doing fantastic I will see her back on an as-needed basis she will now wean off the Macrobid      .      Patient's Body mass index is 23.4 kg/m². BMI is within normal parameters. No follow-up required..              This document has been electronically signed by TRISTAN MOORE MD Gisela 10, 2019 1:49 PM

## 2020-01-17 ENCOUNTER — OFFICE VISIT (OUTPATIENT)
Dept: RETAIL CLINIC | Facility: CLINIC | Age: 45
End: 2020-01-17

## 2020-01-17 VITALS
WEIGHT: 150.6 LBS | OXYGEN SATURATION: 99 % | RESPIRATION RATE: 18 BRPM | BODY MASS INDEX: 24.31 KG/M2 | TEMPERATURE: 99.1 F | HEART RATE: 60 BPM | DIASTOLIC BLOOD PRESSURE: 60 MMHG | SYSTOLIC BLOOD PRESSURE: 120 MMHG

## 2020-01-17 DIAGNOSIS — J06.9 ACUTE URI: Primary | ICD-10-CM

## 2020-01-17 LAB
EXPIRATION DATE: NORMAL
EXPIRATION DATE: NORMAL
FLUAV AG NPH QL: NEGATIVE
FLUBV AG NPH QL: NEGATIVE
INTERNAL CONTROL: NORMAL
INTERNAL CONTROL: NORMAL
Lab: NORMAL
Lab: NORMAL
S PYO AG THROAT QL: NEGATIVE

## 2020-01-17 PROCEDURE — 99213 OFFICE O/P EST LOW 20 MIN: CPT | Performed by: NURSE PRACTITIONER

## 2020-01-17 PROCEDURE — 87804 INFLUENZA ASSAY W/OPTIC: CPT | Performed by: NURSE PRACTITIONER

## 2020-01-17 PROCEDURE — 87880 STREP A ASSAY W/OPTIC: CPT | Performed by: NURSE PRACTITIONER

## 2020-01-17 RX ORDER — PREDNISONE 10 MG/1
TABLET ORAL
Qty: 21 TABLET | Refills: 0 | Status: SHIPPED | OUTPATIENT
Start: 2020-01-17

## 2020-01-17 RX ORDER — BROMPHENIRAMINE MALEATE, PSEUDOEPHEDRINE HYDROCHLORIDE, AND DEXTROMETHORPHAN HYDROBROMIDE 2; 30; 10 MG/5ML; MG/5ML; MG/5ML
10 SYRUP ORAL 4 TIMES DAILY PRN
Qty: 200 ML | Refills: 0 | Status: SHIPPED | OUTPATIENT
Start: 2020-01-17

## 2020-01-17 NOTE — PROGRESS NOTES
Subjective   Dali Woodruff is a 44 y.o. female.   Chief Complaint   Patient presents with   • Sore Throat      Sore Throat    This is a new problem. The current episode started today. The problem has been unchanged. There has been no fever. The pain is at a severity of 5/10. The pain is moderate. Associated symptoms include congestion, coughing and headaches. Associated symptoms comments: Body aches. She has tried NSAIDs for the symptoms. The treatment provided mild relief.        The following portions of the patient's history were reviewed and updated as appropriate: allergies, current medications, past family history, past medical history, past social history, past surgical history and problem list.    Review of Systems   Constitutional: Positive for fatigue.   HENT: Positive for congestion, sinus pressure and sore throat.    Respiratory: Positive for cough.    Musculoskeletal: Positive for myalgias.   Neurological: Positive for headaches.   All other systems reviewed and are negative.      Objective   No Known Allergies    Physical Exam   Constitutional: She is oriented to person, place, and time. She appears well-developed and well-nourished.   HENT:   Right Ear: Tympanic membrane normal.   Left Ear: Tympanic membrane normal.   Nose: Mucosal edema present.   Mouth/Throat: Posterior oropharyngeal erythema present. No oropharyngeal exudate.   Eyes: Pupils are equal, round, and reactive to light.   Neck: Neck supple.   Cardiovascular: Normal rate and regular rhythm.   Pulmonary/Chest: Effort normal and breath sounds normal.   Musculoskeletal: Normal range of motion.   Lymphadenopathy:     She has no cervical adenopathy.   Neurological: She is alert and oriented to person, place, and time.   Skin: Skin is warm and dry.   Psychiatric: She has a normal mood and affect.   Vitals reviewed.      Assessment/Plan   Dali was seen today for sore throat.    Diagnoses and all orders for this visit:    Acute URI  -     POC  Influenza A / B  -     POC Rapid Strep A    Other orders  -     predniSONE (DELTASONE) 10 MG tablet; Take as directed per 6 day pred sharona  -     brompheniramine-pseudoephedrine-DM 30-2-10 MG/5ML syrup; Take 10 mL by mouth 4 (Four) Times a Day As Needed for Congestion or Cough.          Results for orders placed or performed in visit on 01/17/20   POC Influenza A / B   Result Value Ref Range    Rapid Influenza A Ag Negative Negative    Rapid Influenza B Ag Negative Negative    Internal Control Passed Passed    Lot Number [id number]     Expiration Date 12/11/21    POC Rapid Strep A   Result Value Ref Range    Rapid Strep A Screen Negative Negative, VALID, INVALID, Not Performed    Internal Control Passed Passed    Lot Number [id number]     Expiration Date 02/28/21             This document has been electronically signed by ARYA Swenson January 17, 2020 6:05 PM

## 2020-01-17 NOTE — PATIENT INSTRUCTIONS
"Upper Respiratory Infection, Adult  An upper respiratory infection (URI) affects the nose, throat, and upper air passages. URIs are caused by germs (viruses). The most common type of URI is often called \"the common cold.\"  Medicines cannot cure URIs, but you can do things at home to relieve your symptoms. URIs usually get better within 7-10 days.  Follow these instructions at home:  Activity  · Rest as needed.  · If you have a fever, stay home from work or school until your fever is gone, or until your doctor says you may return to work or school.  ? You should stay home until you cannot spread the infection anymore (you are not contagious).  ? Your doctor may have you wear a face mask so you have less risk of spreading the infection.  Relieving symptoms  · Gargle with a salt-water mixture 3-4 times a day or as needed. To make a salt-water mixture, completely dissolve ½-1 tsp of salt in 1 cup of warm water.  · Use a cool-mist humidifier to add moisture to the air. This can help you breathe more easily.  Eating and drinking    · Drink enough fluid to keep your pee (urine) pale yellow.  · Eat soups and other clear broths.  General instructions    · Take over-the-counter and prescription medicines only as told by your doctor. These include cold medicines, fever reducers, and cough suppressants.  · Do not use any products that contain nicotine or tobacco. These include cigarettes and e-cigarettes. If you need help quitting, ask your doctor.  · Avoid being where people are smoking (avoid secondhand smoke).  · Make sure you get regular shots and get the flu shot every year.  · Keep all follow-up visits as told by your doctor. This is important.  How to avoid spreading infection to others    · Wash your hands often with soap and water. If you do not have soap and water, use hand .  · Avoid touching your mouth, face, eyes, or nose.  · Cough or sneeze into a tissue or your sleeve or elbow. Do not cough or sneeze " "into your hand or into the air.  Contact a doctor if:  · You are getting worse, not better.  · You have any of these:  ? A fever.  ? Chills.  ? Brown or red mucus in your nose.  ? Yellow or brown fluid (discharge)coming from your nose.  ? Pain in your face, especially when you bend forward.  ? Swollen neck glands.  ? Pain with swallowing.  ? White areas in the back of your throat.  Get help right away if:  · You have shortness of breath that gets worse.  · You have very bad or constant:  ? Headache.  ? Ear pain.  ? Pain in your forehead, behind your eyes, and over your cheekbones (sinus pain).  ? Chest pain.  · You have long-lasting (chronic) lung disease along with any of these:  ? Wheezing.  ? Long-lasting cough.  ? Coughing up blood.  ? A change in your usual mucus.  · You have a stiff neck.  · You have changes in your:  ? Vision.  ? Hearing.  ? Thinking.  ? Mood.  Summary  · An upper respiratory infection (URI) is caused by a germ called a virus. The most common type of URI is often called \"the common cold.\"  · URIs usually get better within 7-10 days.  · Take over-the-counter and prescription medicines only as told by your doctor.  This information is not intended to replace advice given to you by your health care provider. Make sure you discuss any questions you have with your health care provider.  Document Released: 06/05/2009 Document Revised: 08/10/2018 Document Reviewed: 08/10/2018  [employer] Interactive Patient Education © 2019 [employer]    "

## 2021-04-09 ENCOUNTER — TELEPHONE (OUTPATIENT)
Dept: UROLOGY | Facility: CLINIC | Age: 46
End: 2021-04-09

## 2021-04-09 DIAGNOSIS — N30.20 CHRONIC CYSTITIS: Primary | ICD-10-CM

## 2021-04-09 RX ORDER — NITROFURANTOIN MACROCRYSTALS 100 MG/1
100 CAPSULE ORAL NIGHTLY
Qty: 30 CAPSULE | Refills: 11 | Status: SHIPPED | OUTPATIENT
Start: 2021-04-09

## 2021-04-09 NOTE — TELEPHONE ENCOUNTER
PATIENT SAYS SHE CALLED TWO WEEKS AGO ABOUT GETTING MEDICATIONJ REFILLED I DO NOT SEE ANYTHING ON HER CALLING BUT IF WE CAN REFILL THIS nitrofurantoin, macrocrystal-monohydrate, (MACROBID) 100 MG capsule   AND PLEASE CALL AND LET HER KNOW

## 2023-08-15 DIAGNOSIS — N30.20 CHRONIC CYSTITIS: ICD-10-CM

## 2023-08-15 RX ORDER — NITROFURANTOIN MACROCRYSTALS 100 MG/1
CAPSULE ORAL
Qty: 30 CAPSULE | Refills: 0 | Status: SHIPPED | OUTPATIENT
Start: 2023-08-15

## 2024-06-18 ENCOUNTER — TRANSCRIBE ORDERS (OUTPATIENT)
Dept: ADMINISTRATIVE | Facility: HOSPITAL | Age: 49
End: 2024-06-18
Payer: COMMERCIAL

## 2024-06-18 DIAGNOSIS — R00.2 HEART PALPITATIONS: Primary | ICD-10-CM

## 2024-06-18 DIAGNOSIS — G44.52 NDPH (NEW DAILY PERSISTENT HEADACHE): Primary | ICD-10-CM

## 2024-11-06 ENCOUNTER — TELEPHONE (OUTPATIENT)
Dept: ONCOLOGY | Facility: CLINIC | Age: 49
End: 2024-11-06

## 2024-11-06 NOTE — TELEPHONE ENCOUNTER
Caller: Dali Woodruff    Relationship to patient: Self    Best call back number: [phone]     Chief complaint: PATIENT WAS ORIGINALLY REFERRED TO [facility]    REQUESTING TO RESCHEDULE WITH [facility] BUT IS MORE CONCERNED WITH FIRST AVAIL[facility]    Type of visit: NEW PATIENT    Requested date: FIRST [facility]AIL[facility]

## 2024-11-07 NOTE — PROGRESS NOTES
Subjective     Date: 11/8/2024    Referring Provider  Dr. Esquivel     Chief Complaint  Malignant neoplasm of upper-outer quadrant of left female breast     Subjective          Dali Woodruff is a 49 y.o. female who presents today to [facility] for initial evaluation .    HPI:   49 y.o.female with no previous past medical history who presents for consultation regarding breast cancer.     Oncology history:  July 23 2024: Underwent screening mammogram. Noted to have segmental focal asymmetries associated with amorphous calcifications in upper outer quadrant and associated with palpable area of concern. No suspicious calcifications on R.   July 26 2024: US guided core biopsy. Patholgoy of left breast mass 2:00, 2 cm from nipple with invasive mammary carcinoma with mixed features, low to intermediate nuclear grade, 1-2. DCIS, intermediate nuclear grade. Left axillary lymph node with metastatic carcinoma. Estrogen receptor positive 78%, progesterone receptor low positive 3-4%, HER2 equivocal (2+), negative by RAMOS.   September 17 2024: Diagnostic mammogram confirmed Left breast nodularity, distortion, pleomorphic calcifications representing site of biopsy from malignancy. Biopsy clip left axillary region representing site of biopsy proven john metastasis.   September 17 2024: US chest with left breast implant. Known biopsy proven malignancy represented by isoechoic area with coalification measuring 4.3 x 3.1 x 0.7 cm. Left regional john basin with 5 abnormal left axillary level I lymph nodes, 1.2 x 0.9 x 0.4 cm.   September 17 2024: MRI breast with left breast 4.5 cm x  3.9 cm x 2.7 cm non mass enhancement at outer position 1-4 o clock, known malignancy. Enhancement focally abuts skin, anteriorly extending to 0.4 cm from base of nipple, posteriorly to pectoralis muscle with suggestion of fascia and possibly superficial pectoralis involvement. Left breast non mass enhancement  "at 3 o'clock. Malignancy not excluded.   2024: PET/CT with focus of marked hypermetabolism present in left breast associated with calcifications and/or biopsy marker, consistent with breast cancer. No measurable mass visible. Lymph nodes in left axilla appear normal, metabolic activity is minimally higher than in lymph nodes on right. Somewhat greater breast parenchymal tissue volume in superior left breast is slightly more metabolically active than at other sites. No evidence of malignancy elsewhere.  2024: Evaluated at [facility] in [place]. Recommend neoadjuvant chemotherapy.       GYN History:  Menarche at age 18.   Age of first pregnancy: 19  Age of menopause: partial hysterectomy   Breast feed: yes, 1.5 years  Birth control: no  Hormone replacement: yes, for 2 weeks    Ms. Woodruff presents today with her , Steven.   Former smoker, occasional alcohol use. Family history of paternal grandmother with breast cancer. Sister with ovarian/uterine cancer. Brother with head and neck cancer.    She has many questions about her tumor, prognosis, staging; she was hoping to have these answered at [facility], disappointed with her care. Has surgery and oncology appointments at [facility] next two weeks. Is asking if the tumor could have spread in between the PET/CT and now, requesting additional PET/CT. Also notes intermittent R axillary pain, thinks \"it may be cancer.\" Has been researching about \"alternative treatments\" aside from chemotherapy, unsure if she wants to proceed with treatment. [facility] [place], [place], was found to have BRCA positive, unsure of 1 or 2. Had positive cologuard test 2024, underwent colonoscopy with Dr. Hoyt, found to have polyps.         The following portions of the patient's history were reviewed and updated as appropriate: allergies, current medications, past family history, past medical history, past social history, past " surgical history and problem list.    Objective     Objective     Allergy:   No Known Allergies     Current Medications:   Current Outpatient Medications   Medication Sig Dispense Refill    Multiple Vitamin (DAILY VITAMINS PO) Take  by mouth.      Rimegepant Sulfate (Nurtec) 75 MG tablet dispersible tablet Take 1 tablet by mouth Daily.      brompheniramine-pseudoephedrine-DM 30-2-10 MG/5ML syrup Take 10 mL by mouth 4 (Four) Times a Day As Needed for Congestion or Cough. (Patient not taking: Reported on 2024) 200 mL 0    nitrofurantoin (MACRODANTIN) 100 MG capsule TAKE 1 CAPSULE ONCE EVERY NIGHT (Patient not taking: Reported on 2024) 30 capsule 0    predniSONE (DELTASONE) 10 MG tablet Take as directed per 6 day pred sharona (Patient not taking: Reported on 2024) 21 tablet 0     No current facility-administered medications for this visit.       Past Medical History:  Past Medical History:   Diagnosis Date    Urinary tract infection        Past Surgical History:  Past Surgical History:   Procedure Laterality Date     SECTION      male     SECTION      male     SECTION      female    HYSTERECTOMY  2012    partial, repair on urethra    VAGINAL DELIVERY      male       Social History:  Social History     Socioeconomic History    Marital status:    Tobacco Use    Smoking status: Former     Current packs/day: 0.00     Types: Cigarettes     Quit date:      Years since quittin.8    Smokeless tobacco: Never   Substance and Sexual Activity    Alcohol use: No    Drug use: No    Sexual activity: Defer     Birth control/protection: Surgical     Comment: , has 4 children, 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   All other systems reviewed and are negative.      Vital Signs:   /77   Pulse 78   Temp 98 °F (36.7 °C) (Temporal)   Resp 20   Ht 167.6 cm (66\")   Wt 62 kg (136 lb 9.6 oz)   SpO2 97%   BMI 22.05 kg/m²      Physical Exam:  Physical Exam  Vitals reviewed.   Constitutional:       General: She is not in acute distress.     Appearance: Normal appearance. She is not ill-appearing.   HENT:      Head: Normocephalic and atraumatic.      Mouth/Throat:      Mouth: Mucous membranes are moist.      Pharynx: Oropharynx is clear.   Eyes:      Conjunctiva/sclera: Conjunctivae normal.      Pupils: Pupils are equal, round, and reactive to light.   Cardiovascular:      Rate and Rhythm: Normal rate and regular rhythm.      Heart sounds: No murmur heard.  Pulmonary:      Effort: Pulmonary effort is normal. No respiratory distress.      Breath sounds: Normal breath sounds. No wheezing.   Chest:      Comments: L breast 2.5 cm mass upper outer quadrant. +axillary LAD  B/L breast with implants  Abdominal:      General: Abdomen is flat. Bowel sounds are normal. There is no distension.      Palpations: Abdomen is soft. There is no mass.      Tenderness: There is no abdominal tenderness. There is no guarding.   Musculoskeletal:         General: No swelling. Normal range of motion.      Cervical back: Normal range of motion.   Lymphadenopathy:      Cervical: No cervical adenopathy.   Skin:     General: Skin is warm and dry.   Neurological:      General: No focal deficit present.      Mental Status: She is alert and oriented to person, place, and time. Mental status is at baseline.   Psychiatric:         Mood and Affect: Mood normal.         PHQ-9 Score  PHQ-9 Total Score: 3     Pain Score  Vitals:    11/08/24 1011   BP: 114/77   Pulse: 78   Resp: 20   Temp: 98 °F (36.7 °C)   TempSrc: Temporal   SpO2: 97%   Weight: 62 kg (136 lb 9.6 oz)   Height: 167.6 cm (66\")   PainSc: 0-No pain "       ECOG score: 0           PAINSCOREQUALITYMETRIC:   Vitals:    11/08/24 1011   PainSc: 0-No pain          Lab Review  Lab Results   Component Value Date    WBC 3.82 11/08/2024    HGB 12.8 11/08/2024    HCT 39.6 11/08/2024    MCV 92.5 11/08/2024    RDW 12.6 11/08/2024     11/08/2024    NEUTRORELPCT 57.8 11/08/2024    LYMPHORELPCT 32.5 11/08/2024    MONORELPCT 8.4 11/08/2024    EOSRELPCT 1.0 11/08/2024    BASORELPCT 0.3 11/08/2024    NEUTROABS 2.21 11/08/2024    LYMPHSABS 1.24 11/08/2024     Lab Results   Component Value Date     11/08/2024    K 4.2 11/08/2024    CO2 26.7 11/08/2024     (H) 11/08/2024    BUN 7 11/08/2024    CREATININE 0.64 11/08/2024    EGFRIFNONA 102 02/01/2019    GLUCOSE 99 11/08/2024    CALCIUM 9.4 11/08/2024    ALKPHOS 63 11/08/2024    AST 20 11/08/2024    ALT 15 11/08/2024    BILITOT 0.8 11/08/2024    ALBUMIN 4.5 11/08/2024    PROTEINTOT 7.4 11/08/2024       Radiology Results    IMAGING SCANNED (09/06/2024)           MRI Breast Bilateral Screening With & Without Contrast (09/19/2024 10:22)   FINDINGS:   The background parenchymal enhancement is mild and symmetric.   The breasts have heterogeneous fibroglandular tissue.   The study is suboptimal due to motion and arms down positioning.     Left   1) Non-mass Enhancement: There is a 4.5 cm x 3.9 cm x 2.7 cm (in the AP,   craniocaudal and transverse dimensions) non-mass enhancement seen in the   outer region of the left breast at 1-4 o'clock, 0.4 cm from the base of   the nipple.  The abnormal enhancement focally abuts the skin in the   lateral breast (series 7 image 316).  Susceptibility artifact from a   biopsy clip is seen within the non-mass enhancement.  Posteriorly, the   non-mass enhancement extends to the pectoralis muscles with loss of fat   plane at left 3-4 o'clock (series 7 image [id number]) to suggest fascia and   possibly superficial pectoralis muscle involvement.     2) Non-mass Enhancement: There is a 0.5 cm  non-mass enhancement seen in   the left breast at 3 o'clock, 6 cm from the nipple (series 10 image 16,   series 7 image 314).  It is approximately 2 cm superior posterior from the   index known malignancy.     Left regional john basins: Due to motion and arms down positioning,   evaluation of the axillary lymph nodes is markedly suboptimal and better   evaluated by patient's ultrasound of 9/17/2024.  No suspicious adenopathy   is seen in the left internal mammary region.     Right   No suspicious mass or abnormal enhancement is identified in the breast.     No suspicious adenopathy is seen within the visualized right axilla or   internal mammary regions.     IMPRESSION:   Left   1) Non-mass Enhancement: Left breast 4.5 cm x 3.9 cm x 2.7 cm non-mass   enhancement at the outer position and 1-4 o'clock is known malignancy.    The abnormal enhancement focally abuts the skin, anteriorly extending to   0.4 cm from the base of the nipple, posteriorly to the pectoralis muscle   with suggestion of fascia and possibly superficial pectoralis involvement.     2) Non-mass Enhancement: Left breast non-mass enhancement at 3 o'clock.   While differential diagnoses include an intramammary lymph node, given its   relatively close proximity to the known malignancy and the patient's known   left axillary lymphadenopathy, malignancy cannot be excluded.  Second look   ultrasound with possible biopsy is recommended if breast conserving   therapy is planned.  Please note, due to the location of the non-mass   enhancement, it is not amenable to MRI guided biopsy.     Right   No suspicious mass or abnormal enhancement is identified in the breast.     Overall BI-RAD Category: 4 - Suspicious     Second look ultrasound with possible biopsy is recommended for the left   breast.     US breast left (09/17/2024 10:56)   FINDINGS:   Left Breast   Note is made of a left breast implant.  The known biopsy-proven malignancy   is represented by an  isoechoic area with associated calcifications   measuring 4.3 x 3.1 x 0.7 cm with focal extension toward the overlying   skin as demonstrated mammographically.  By ultrasound, the area extends to   within 0.8 cm from the nipple.  An associated clip is located 3 cm from   the nipple.  No additional suspicious masses in the left breast.     Left Regional Kervin Basins   There are 5 abnormal left axillary level I lymph nodes including the index   lymph node with outside facility clip measuring 1.2 x 0.9 x 0.4 cm.  This   represents site of biopsy-proven metastasis.  There are 5 abnormal level   II lymph nodes including a Ashwin's node measuring 0.3 x 0.3 x 0.2 cm.    Left axillary level III lymph nodes demonstrate fatty luz.  No left   axillary levels III, internal mammary chain, supraclavicular or low neck   adenopathy.     IMPRESSION:   Known left breast malignancy with associated left axillary levels I and II   lymphadenopathy as described above.     Overall BI-RAD Category: 6 - Known Biopsy-Proven Malignancy.  Breast MRI   is recommended.       Mammo Diagnostic Digital Tomosynthesis Bilateral With CAD (09/17/2024 09:44)   FINDINGS:   The breasts are heterogeneously dense, which may obscure small masses.     There are bilateral retropectoral saline implants.     1: There is an area of nodularity, distortion, and pleomorphic   calcifications in the left breast extending from the 12-3 o'clock position   beginning 1.5 cm from the nipple and measuring 4.8 x 3.3 x 3 cm.  Within   this area, a wing clip is identified from US guide biopsy of an associated   mass representing site of biopsy-proven invasive mammary carcinoma with   mixed features and DCIS (pathology reviewed at [facility]).  Calcifications   extend to within -.3 cm of the overlying lateral skin on the CC   magnification view.  A component of the underlying mass may extend to the   overlying skin at the upper outer aspect.  OSF also noted an additional   mass in  the 2 o'clock position, 5 cm from the nipple.  US is recommended   for staging.  Breast MRI is recommended for staging.     2: There is a biopsy clip in the left axillary region representing site of   biopsy proven john metastasis.     No dominant mass, architectural distortion, or suspicious calcifications   are identified in the right breast.     IMPRESSION:   1: Left breast area of nodularity, distortion, and pleomorphic   calcifications representing site of biopsy proven malignancy.  US is   recommended for staging.  Breast MRI is recommended for staging.     2: There is a biopsy clip in the left axillary region representing site of   biopsy proven john metastasis. US is recommended for staging.     Overall BI-RAD Category: 6 - Known Biopsy-Proven Malignancy     Additional Imaging: Breast Ultrasound is recommended. Breast MRI is   recommended given dense breast tissue and patient's age at diagnosis of   breast cancer.         Pathology:   07/26/2024      Oncotype (07/26/2024)          Assessment / Plan         Assessment and Plan   Dali Woodruff is a 49 y.o. presents for     Invasive mammary carcinoma. L breast   Cancer Staging   Stage IIA (cT2, cN1(f), cM0, G2, ER+, NV+, HER2-)  -Patient noted to have focal asymmetrical calcifications upper outer quadrant L breast on screening mammogram (7/2024), follow up biopsy confirmed invasive mamary carcinoma with mixed features (7/2024). Diagnostic mammogram with 4.3 x 3.1 x 0.7 cm (L) breast. Left regional john basin with 5 abnormal left axillary level I lymph nodes, 1.2 x 0.9 x 0.4 cm. (9/2024). MRI breast with confirmed lesions as above.   PET/CT without widespread disease. Evaluated at [facility] in [place]. Recommend neoadjuvant chemotherapy. (9/2024)   -We discussed neoadjuvant chemotherapy with DDAC q14 days X 4 cycles with GCSF followed by weekly paclitaxel X 12 week. Adverse events were reviewed with her including fatigue, nausea, vomiting, diarrhea,  alopecia, myelosuppression, cardiac toxicity, peripheral neuropathy. chemoExpert handout provided. She is agreeable to therapy.  -Would recommend mediport insertion, refer to general surgery.  -Order TTE prior to doxorubicin   -Review genetic testing from [place], [place]. Records requested  -we reviewed her treatment with chemotherapy would follow surgery, she is unsure if she will have mastectomies (b/l) in [place] with her established surgeon, Jaziel with [facility], or here.   -this would follow radiation therapy, followed by endocrine therapy with or without CDK4/6 inhibitors and/or PARPi.   -Baseline labs ordered     2. R axilla pain   - US of R axilla ordered     3. Malignancy associated pain   - Currently denies     4. Cardiac health   -TTE ordered    5. Bone health   - Would obtain DEXA bone scan prior to AI     6. Positive cologuard test  - Patient with recent colonoscopy with Dr. Hoyt. Request records             Discussed possible differential diagnoses, testing, treatment, recommended non-pharmacological interventions, risks, warning signs to monitor for that would indicate need for follow-up in clinic or ER. If no improvement with these regimens or you have new or worsening symptoms follow-up. Patient verbalizes understanding and agreement with plan of care. Denies further needs or concerns.     Patient was given instructions and counseling regarding her condition and for health maintenance advised.       All questions were answered to her satisfaction.    BMI cannot be calculated due to outdated height or weight values.  Please input a current height/weight in Vitals and re-renter BMIFOLLOWUP in Note to pull in correct documentation based on BMI range.         ACO / UDAY/Other  Quality measures  -  Dali Woodruff did not receive 2024 flu vaccine.  This was recommended.  -  Dali Woodruff reports a pain score of 0.  Given her pain assessment as noted, treatment options were discussed and the  following options were decided upon as a follow-up plan to address the patient's pain: continuation of current treatment plan for pain.  -  Current outpatient and discharge medications have been reconciled for the patient.  Reviewed by: Abbie Yin MD          Meds ordered during this visit  No orders of the defined types were placed in this encounter.      Visit Diagnoses    ICD-10-CM ICD-9-CM   1. Malignant neoplasm of upper-outer quadrant of left breast in female, estrogen receptor positive  C50.412 174.4    Z17.0 V86.0       Follow Up   Prior to cycle 2 of DDAC        This document has been electronically signed by Abbie Yin MD   November 8, 2024 16:00 EST    Dictated Utilizing Dragon Dictation: Part of this note may be an electronic transcription/translation of spoken language to printed text using the Dragon Dictation System.

## 2024-11-08 ENCOUNTER — PATIENT OUTREACH (OUTPATIENT)
Dept: ONCOLOGY | Facility: CLINIC | Age: 49
End: 2024-11-08
Payer: COMMERCIAL

## 2024-11-08 ENCOUNTER — CONSULT (OUTPATIENT)
Dept: ONCOLOGY | Facility: CLINIC | Age: 49
End: 2024-11-08
Payer: COMMERCIAL

## 2024-11-08 ENCOUNTER — LAB (OUTPATIENT)
Dept: ONCOLOGY | Facility: CLINIC | Age: 49
End: 2024-11-08
Payer: COMMERCIAL

## 2024-11-08 VITALS
WEIGHT: 136.6 LBS | HEART RATE: 78 BPM | SYSTOLIC BLOOD PRESSURE: 114 MMHG | BODY MASS INDEX: 21.95 KG/M2 | OXYGEN SATURATION: 97 % | TEMPERATURE: 98 F | DIASTOLIC BLOOD PRESSURE: 77 MMHG | RESPIRATION RATE: 20 BRPM | HEIGHT: 66 IN

## 2024-11-08 DIAGNOSIS — Z17.0 MALIGNANT NEOPLASM OF UPPER-OUTER QUADRANT OF LEFT BREAST IN FEMALE, ESTROGEN RECEPTOR POSITIVE: Primary | ICD-10-CM

## 2024-11-08 DIAGNOSIS — C50.412 MALIGNANT NEOPLASM OF UPPER-OUTER QUADRANT OF LEFT BREAST IN FEMALE, ESTROGEN RECEPTOR POSITIVE: Primary | ICD-10-CM

## 2024-11-08 LAB
ALBUMIN SERPL-MCNC: 4.5 G/DL (ref 3.5–5.2)
ALBUMIN/GLOB SERPL: 1.6 G/DL
ALP SERPL-CCNC: 63 U/L (ref 39–117)
ALT SERPL W P-5'-P-CCNC: 15 U/L (ref 1–33)
ANION GAP SERPL CALCULATED.3IONS-SCNC: 10.3 MMOL/L (ref 5–15)
AST SERPL-CCNC: 20 U/L (ref 1–32)
BASOPHILS # BLD AUTO: 0.01 10*3/MM3 (ref 0–0.2)
BASOPHILS NFR BLD AUTO: 0.3 % (ref 0–1.5)
BILIRUB SERPL-MCNC: 0.8 MG/DL (ref 0–1.2)
BUN SERPL-MCNC: 7 MG/DL (ref 6–20)
BUN/CREAT SERPL: 10.9 (ref 7–25)
CALCIUM SPEC-SCNC: 9.4 MG/DL (ref 8.6–10.5)
CHLORIDE SERPL-SCNC: 108 MMOL/L (ref 98–107)
CO2 SERPL-SCNC: 26.7 MMOL/L (ref 22–29)
CREAT SERPL-MCNC: 0.64 MG/DL (ref 0.57–1)
DEPRECATED RDW RBC AUTO: 42.7 FL (ref 37–54)
EGFRCR SERPLBLD CKD-EPI 2021: 109.2 ML/MIN/1.73
EOSINOPHIL # BLD AUTO: 0.04 10*3/MM3 (ref 0–0.4)
EOSINOPHIL NFR BLD AUTO: 1 % (ref 0.3–6.2)
ERYTHROCYTE [DISTWIDTH] IN BLOOD BY AUTOMATED COUNT: 12.6 % (ref 12.3–15.4)
GLOBULIN UR ELPH-MCNC: 2.9 GM/DL
GLUCOSE SERPL-MCNC: 99 MG/DL (ref 65–99)
HCT VFR BLD AUTO: 39.6 % (ref 34–46.6)
HGB BLD-MCNC: 12.8 G/DL (ref 12–15.9)
IMM GRANULOCYTES # BLD AUTO: 0 10*3/MM3 (ref 0–0.05)
IMM GRANULOCYTES NFR BLD AUTO: 0 % (ref 0–0.5)
LYMPHOCYTES # BLD AUTO: 1.24 10*3/MM3 (ref 0.7–3.1)
LYMPHOCYTES NFR BLD AUTO: 32.5 % (ref 19.6–45.3)
MCH RBC QN AUTO: 29.9 PG (ref 26.6–33)
MCHC RBC AUTO-ENTMCNC: 32.3 G/DL (ref 31.5–35.7)
MCV RBC AUTO: 92.5 FL (ref 79–97)
MONOCYTES # BLD AUTO: 0.32 10*3/MM3 (ref 0.1–0.9)
MONOCYTES NFR BLD AUTO: 8.4 % (ref 5–12)
NEUTROPHILS NFR BLD AUTO: 2.21 10*3/MM3 (ref 1.7–7)
NEUTROPHILS NFR BLD AUTO: 57.8 % (ref 42.7–76)
NRBC BLD AUTO-RTO: 0 /100 WBC (ref 0–0.2)
PLATELET # BLD AUTO: 156 10*3/MM3 (ref 140–450)
PMV BLD AUTO: 11.2 FL (ref 6–12)
POTASSIUM SERPL-SCNC: 4.2 MMOL/L (ref 3.5–5.2)
PROT SERPL-MCNC: 7.4 G/DL (ref 6–8.5)
RBC # BLD AUTO: 4.28 10*6/MM3 (ref 3.77–5.28)
SODIUM SERPL-SCNC: 145 MMOL/L (ref 136–145)
WBC NRBC COR # BLD AUTO: 3.82 10*3/MM3 (ref 3.4–10.8)

## 2024-11-08 PROCEDURE — 80053 COMPREHEN METABOLIC PANEL: CPT | Performed by: INTERNAL MEDICINE

## 2024-11-08 PROCEDURE — 85025 COMPLETE CBC W/AUTO DIFF WBC: CPT | Performed by: INTERNAL MEDICINE

## 2024-11-08 RX ORDER — RIMEGEPANT SULFATE 75 MG/75MG
1 TABLET, ORALLY DISINTEGRATING ORAL DAILY
COMMUNITY
Start: 2024-06-18

## 2024-11-08 NOTE — SIGNIFICANT NOTE
Met with the patient and her  prior to their consultation with Dr. Annamaria deluna. Introduced my role as the Nurse Navigator and outlined the support and resources I can provide. Provided my contact information and encouraged the patient to reach out with any questions or concerns; patient indicated understanding. Since I know the patient and her  from the community, I asked if they were comfortable with me staying for the consultation. Although the patient appeared hesitant and did not explicitly decline, I determined it was most appropriate to not remain for the consultation and instead offered my continued support.

## 2024-11-08 NOTE — PROGRESS NOTES
Venipuncture Blood Specimen Collection  Venipuncture performed in right arm by Thea Goyal MA with good hemostasis. Patient tolerated the procedure well without complications.   11/08/24   Thea Goyal MA

## 2024-11-11 ENCOUNTER — PATIENT ROUNDING (BHMG ONLY) (OUTPATIENT)
Dept: ONCOLOGY | Facility: CLINIC | Age: 49
End: 2024-11-11
Payer: COMMERCIAL

## 2024-11-11 ENCOUNTER — PATIENT OUTREACH (OUTPATIENT)
Dept: ONCOLOGY | Facility: CLINIC | Age: 49
End: 2024-11-11
Payer: COMMERCIAL

## 2024-11-11 NOTE — SIGNIFICANT NOTE
NN called patient on this date to follow up after med/onc consult. Patient expressed to me that she has had multiple consultations in regards to her cancer since her diagnosis of breast cancer in July. Patient seems to be conflicted mainly due to the overwhelming information she has researched and information she has gathered from all physicians she has had consultations with from [place], [place], and [place]. However, it sounds that all physicians have had the same idea in treatment plan for her. Despite this patient wants to meet with an oncology surgeon at [facility] this week and next week she has one with  medical oncologist. I answered all the questions I could for her and advised her to not delay her care any longer. I gave the patient my number and encouraged her to call with any questions or concerns.

## 2024-11-12 ENCOUNTER — TELEPHONE (OUTPATIENT)
Dept: ONCOLOGY | Facility: CLINIC | Age: 49
End: 2024-11-12

## 2024-11-12 ENCOUNTER — OFFICE VISIT (OUTPATIENT)
Dept: SURGERY | Facility: CLINIC | Age: 49
End: 2024-11-12
Payer: COMMERCIAL

## 2024-11-12 VITALS
SYSTOLIC BLOOD PRESSURE: 100 MMHG | DIASTOLIC BLOOD PRESSURE: 68 MMHG | HEIGHT: 66 IN | WEIGHT: 136 LBS | BODY MASS INDEX: 21.86 KG/M2

## 2024-11-12 DIAGNOSIS — Z17.0 MALIGNANT NEOPLASM OF UPPER-OUTER QUADRANT OF LEFT BREAST IN FEMALE, ESTROGEN RECEPTOR POSITIVE: ICD-10-CM

## 2024-11-12 DIAGNOSIS — C50.412 MALIGNANT NEOPLASM OF UPPER-OUTER QUADRANT OF LEFT BREAST IN FEMALE, ESTROGEN RECEPTOR POSITIVE: ICD-10-CM

## 2024-11-12 DIAGNOSIS — R59.1 LYMPHADENOPATHY: Primary | ICD-10-CM

## 2024-11-12 RX ORDER — SODIUM CHLORIDE 0.9 % (FLUSH) 0.9 %
10 SYRINGE (ML) INJECTION EVERY 12 HOURS SCHEDULED
Status: CANCELLED | OUTPATIENT
Start: 2024-11-15

## 2024-11-12 RX ORDER — SODIUM CHLORIDE 0.9 % (FLUSH) 0.9 %
10 SYRINGE (ML) INJECTION AS NEEDED
Status: CANCELLED | OUTPATIENT
Start: 2024-11-15

## 2024-11-12 NOTE — TELEPHONE ENCOUNTER
Caller: Yara Woodruff    Relationship: Self    Best call back number: [phone]    What is the best time to reach you: ANY    Who are you requesting to speak with (clinical staff, provider,  specific staff member): CLINICAL    What was the call regarding: YARA IS CALLING WANTING TO MAKE AN APPOINTMENT WITH DR MCINTYRE   SHE SAW DR HARDY ON 11-8    SHE STATES THAT SHE WANTS TO SEE DR MCINTYRE BECAUSE SHE FEELS LIKE DR MCINTYRE HAS MORE EXPERIENCE    YARA STATES SHE WANTS AN APPOINTMENT AS SOON AS SHE CAN    PLEASE ADVISE

## 2024-11-12 NOTE — PROGRESS NOTES
Subjective   Dali Woodruff is a 49 y.o. female who presents today for Initial Evaluation    Chief Complaint:    Chief Complaint   Patient presents with    port placement        History of Present Illness:    History of Present Illness Dali is a 49-year-old female who presents for evaluation for port placement.  She was recently diagnosed with malignant neoplasm of the upper outer quadrant of her left breast.  She states that she has been evaluated by oncology at our facility.  She has an appointment with breast surgery at  this Thursday and oncology at  the following Thursday.  She reports that she will undergo chemotherapy here at our facility.  Patient states that she was evaluated by Dr. Hicks approximately 10 years ago for lymphadenopathy in her inguinal area.  She reports that approximately 3 months later that lymph node return to normal size.  However, she states that it occasionally will become enlarged and she is slightly concerned that she can feel it again.  She does report a history of a partial hysterectomy in the past.  She is not on any blood thinning medications.    The following portions of the patient's history were reviewed and updated as appropriate: allergies, current medications, past family history, past medical history, past social history, past surgical history and problem list.    Past Medical History:  Past Medical History:   Diagnosis Date    Urinary tract infection        Social History:  Social History     Socioeconomic History    Marital status:    Tobacco Use    Smoking status: Former     Current packs/day: 0.00     Types: Cigarettes     Quit date:      Years since quittin.8     Passive exposure: Past    Smokeless tobacco: Never   Vaping Use    Vaping status: Never Used   Substance and Sexual Activity    Alcohol use: No    Drug use: No    Sexual activity: Defer     Birth control/protection: Surgical     Comment: , has 4 children, homemaker takes care of  "her 23 year old handicapped son       Family History:  Family History   Problem Relation Age of Onset    Cancer Brother     Cancer Maternal Grandmother     Diabetes Neg Hx     Heart disease Neg Hx     Hypertension Neg Hx        Past Surgical History:  Past Surgical History:   Procedure Laterality Date     SECTION      male     SECTION      male     SECTION  2004    female    HYSTERECTOMY  2012    partial, repair on urethra    VAGINAL DELIVERY      male       Problem List:  Patient Active Problem List   Diagnosis    Lymphadenopathy    Chronic cystitis    Malignant neoplasm of upper-outer quadrant of left breast in female, estrogen receptor positive       Allergy:   No Known Allergies     Current Medications:   Current Outpatient Medications   Medication Sig Dispense Refill    Multiple Vitamin (DAILY VITAMINS PO) Take  by mouth.       No current facility-administered medications for this visit.       Review of Systems:    Review of Systems      Physical Exam:   Physical Exam  Constitutional:       Appearance: Normal appearance.   HENT:      Head: Normocephalic and atraumatic.      Right Ear: External ear normal.      Left Ear: External ear normal.   Eyes:      Conjunctiva/sclera: Conjunctivae normal.   Pulmonary:      Effort: Pulmonary effort is normal.   Abdominal:      General: Abdomen is flat.   Musculoskeletal:         General: Normal range of motion.      Cervical back: Normal range of motion and neck supple.   Skin:     General: Skin is warm and dry.      Capillary Refill: Capillary refill takes less than 2 seconds.   Neurological:      General: No focal deficit present.      Mental Status: She is alert and oriented to person, place, and time.   Psychiatric:         Mood and Affect: Mood normal.         Behavior: Behavior normal.         Vitals:  Blood pressure 100/68, height 167.6 cm (65.98\"), weight 61.7 kg (136 lb).   Body mass index is 21.96 kg/m².       Assessment & Plan "   Diagnoses and all orders for this visit:    1. Lymphadenopathy (Primary)  -     US Soft Tissue; Future    2. Malignant neoplasm of upper-outer quadrant of left breast in female, estrogen receptor positive  -     Case Request; Standing  -     sodium chloride 0.9 % flush 10 mL  -     sodium chloride 0.9 % flush 10 mL  -     ceFAZolin 2000 mg IVPB in 100 mL NS (VTB)  -     Case Request    Other orders  -     Follow Anesthesia Guidelines / Protocol; Future  -     Follow Anesthesia Guidelines / Protocol; Standing  -     Verify / Perform Chlorhexidine Skin Prep; Standing  -     Provide NPO Instructions to Patient; Future  -     Chlorhexidine Skin Prep; Future  -     Insert Peripheral IV; Standing  -     Saline Lock & Maintain IV Access; Standing  -     Place Sequential Compression Device; Standing  -     Maintain Sequential Compression Device; Standing    Dali is a 49-year-old female who presents for evaluation for port placement.  She undergo port placement with Dr. Hicks this week.  Verbalized understanding of prep instructions and procedure wished to proceed.  Have ordered an ultrasound of her inguinal area to further evaluate lymphadenopathy.    Visit Diagnoses:    ICD-10-CM ICD-9-CM   1. Lymphadenopathy  R59.1 785.6   2. Malignant neoplasm of upper-outer quadrant of left breast in female, estrogen receptor positive  C50.412 174.4    Z17.0 V86.0         MEDS ORDERED DURING VISIT:  No orders of the defined types were placed in this encounter.      Return if symptoms worsen or fail to improve.             This document has been electronically signed by ARYA Shoemaker  November 12, 2024 10:37 EST    Please note that portions of this note were completed with a voice recognition program.

## 2024-11-13 ENCOUNTER — HOSPITAL ENCOUNTER (OUTPATIENT)
Facility: HOSPITAL | Age: 49
Discharge: HOME | End: 2024-11-13
Admitting: INTERNAL MEDICINE
Payer: COMMERCIAL

## 2024-11-13 ENCOUNTER — APPOINTMENT (OUTPATIENT)
Facility: HOSPITAL | Age: 49
End: 2024-11-13
Payer: COMMERCIAL

## 2024-11-13 DIAGNOSIS — C50.412 MALIGNANT NEOPLASM OF UPPER-OUTER QUADRANT OF LEFT BREAST IN FEMALE, ESTROGEN RECEPTOR POSITIVE: ICD-10-CM

## 2024-11-13 DIAGNOSIS — Z17.0 MALIGNANT NEOPLASM OF UPPER-OUTER QUADRANT OF LEFT BREAST IN FEMALE, ESTROGEN RECEPTOR POSITIVE: ICD-10-CM

## 2024-11-13 LAB
BH CV ECHO MEAS - AO MAX PG: 4.2 MMHG
BH CV ECHO MEAS - AO MEAN PG: 2.8 MMHG
BH CV ECHO MEAS - AO ROOT DIAM: 3.6 CM
BH CV ECHO MEAS - AO V2 MAX: 103 CM/SEC
BH CV ECHO MEAS - AO V2 VTI: 22.5 CM
BH CV ECHO MEAS - EDV(MOD-SP4): 65 ML
BH CV ECHO MEAS - EF(MOD-SP4): 74.5 %
BH CV ECHO MEAS - ESV(MOD-SP4): 16.6 ML
BH CV ECHO MEAS - IVS/LVPW: 0.72 CM
BH CV ECHO MEAS - IVSD: 0.81 CM
BH CV ECHO MEAS - LA DIMENSION: 3 CM
BH CV ECHO MEAS - LAT PEAK E' VEL: 14.2 CM/SEC
BH CV ECHO MEAS - LV DIASTOLIC VOL/BSA (35-75): 38.2 CM2
BH CV ECHO MEAS - LV MAX PG: 3.7 MMHG
BH CV ECHO MEAS - LV MEAN PG: 1.9 MMHG
BH CV ECHO MEAS - LV SYSTOLIC VOL/BSA (12-30): 9.8 CM2
BH CV ECHO MEAS - LV V1 MAX: 96 CM/SEC
BH CV ECHO MEAS - LV V1 VTI: 18.3 CM
BH CV ECHO MEAS - LVIDD: 4.2 CM
BH CV ECHO MEAS - LVIDS: 2.6 CM
BH CV ECHO MEAS - LVOT DIAM: 2.07 CM
BH CV ECHO MEAS - LVPWD: 1.13 CM
BH CV ECHO MEAS - MED PEAK E' VEL: 13.4 CM/SEC
BH CV ECHO MEAS - PA ACC TIME: 0.21 SEC
BH CV ECHO MEAS - RAP SYSTOLE: 10 MMHG
BH CV ECHO MEAS - RVSP: 11.2 MMHG
BH CV ECHO MEAS - SV(MOD-SP4): 48.4 ML
BH CV ECHO MEAS - SVI(MOD-SP4): 28.5 ML/M2
BH CV ECHO MEAS - TAPSE (>1.6): 2.37 CM
BH CV ECHO MEAS - TR MAX PG: 1.18 MMHG
BH CV ECHO MEAS - TR MAX VEL: 54 CM/SEC
LV EF 2D ECHO EST: 65 %

## 2024-11-13 PROCEDURE — 93306 TTE W/DOPPLER COMPLETE: CPT

## 2024-11-15 ENCOUNTER — HOSPITAL ENCOUNTER (OUTPATIENT)
Facility: HOSPITAL | Age: 49
Setting detail: HOSPITAL OUTPATIENT SURGERY
Discharge: HOME | End: 2024-11-15
Attending: SURGERY | Admitting: SURGERY
Payer: COMMERCIAL

## 2024-11-15 ENCOUNTER — APPOINTMENT (OUTPATIENT)
Dept: GENERAL RADIOLOGY | Facility: HOSPITAL | Age: 49
End: 2024-11-15
Payer: COMMERCIAL

## 2024-11-15 ENCOUNTER — ANESTHESIA EVENT (OUTPATIENT)
Dept: PERIOP | Facility: HOSPITAL | Age: 49
End: 2024-11-15
Payer: COMMERCIAL

## 2024-11-15 ENCOUNTER — ANESTHESIA (OUTPATIENT)
Dept: PERIOP | Facility: HOSPITAL | Age: 49
End: 2024-11-15
Payer: COMMERCIAL

## 2024-11-15 VITALS
OXYGEN SATURATION: 98 % | HEART RATE: 68 BPM | SYSTOLIC BLOOD PRESSURE: 115 MMHG | HEIGHT: 65 IN | TEMPERATURE: 97.7 F | RESPIRATION RATE: 18 BRPM | DIASTOLIC BLOOD PRESSURE: 76 MMHG | BODY MASS INDEX: 22.66 KG/M2 | WEIGHT: 136 LBS

## 2024-11-15 DIAGNOSIS — C50.412 MALIGNANT NEOPLASM OF UPPER-OUTER QUADRANT OF LEFT BREAST IN FEMALE, ESTROGEN RECEPTOR POSITIVE: ICD-10-CM

## 2024-11-15 DIAGNOSIS — Z17.0 MALIGNANT NEOPLASM OF UPPER-OUTER QUADRANT OF LEFT BREAST IN FEMALE, ESTROGEN RECEPTOR POSITIVE: ICD-10-CM

## 2024-11-15 PROCEDURE — C1788 PORT, INDWELLING, IMP: HCPCS | Performed by: SURGERY

## 2024-11-15 PROCEDURE — 25010000002 FENTANYL CITRATE (PF) 50 MCG/ML SOLUTION: Performed by: NURSE ANESTHETIST, CERTIFIED REGISTERED

## 2024-11-15 PROCEDURE — 25010000002 PROPOFOL 200 MG/20ML EMULSION: Performed by: NURSE ANESTHETIST, CERTIFIED REGISTERED

## 2024-11-15 PROCEDURE — 25010000002 LIDOCAINE 1 % SOLUTION: Performed by: SURGERY

## 2024-11-15 PROCEDURE — 25010000002 ONDANSETRON PER 1 MG: Performed by: NURSE ANESTHETIST, CERTIFIED REGISTERED

## 2024-11-15 PROCEDURE — 25010000002 DEXAMETHASONE PER 1 MG: Performed by: NURSE ANESTHETIST, CERTIFIED REGISTERED

## 2024-11-15 PROCEDURE — 71045 X-RAY EXAM CHEST 1 VIEW: CPT

## 2024-11-15 PROCEDURE — 76937 US GUIDE VASCULAR ACCESS: CPT | Performed by: SURGERY

## 2024-11-15 PROCEDURE — 36561 INSERT TUNNELED CV CATH: CPT | Performed by: SURGERY

## 2024-11-15 PROCEDURE — 25010000002 HEPARIN LOCK FLUSH PER 10 UNITS: Performed by: SURGERY

## 2024-11-15 PROCEDURE — 76000 FLUOROSCOPY <1 HR PHYS/QHP: CPT

## 2024-11-15 PROCEDURE — 76000 FLUOROSCOPY <1 HR PHYS/QHP: CPT | Performed by: RADIOLOGY

## 2024-11-15 PROCEDURE — 71045 X-RAY EXAM CHEST 1 VIEW: CPT | Performed by: RADIOLOGY

## 2024-11-15 PROCEDURE — 77001 FLUOROGUIDE FOR VEIN DEVICE: CPT | Performed by: SURGERY

## 2024-11-15 PROCEDURE — 25010000002 CEFAZOLIN PER 500 MG

## 2024-11-15 PROCEDURE — 25010000002 MIDAZOLAM PER 1 MG: Performed by: NURSE ANESTHETIST, CERTIFIED REGISTERED

## 2024-11-15 DEVICE — POWERPORT CLEARVUE ISP IMPLANTABLE PORT WITH ATTACHABLE 8F POLYURETHANE OPEN-ENDED SINGLE-LUMEN VENOUS CATHETER PROCEDURAL KIT
Type: IMPLANTABLE DEVICE | Site: INTERNAL JUGULAR | Status: FUNCTIONAL
Brand: POWERPORT CLEARVUE

## 2024-11-15 RX ORDER — OXYCODONE AND ACETAMINOPHEN 5; 325 MG/1; MG/1
1 TABLET ORAL ONCE AS NEEDED
Status: COMPLETED | OUTPATIENT
Start: 2024-11-15 | End: 2024-11-15

## 2024-11-15 RX ORDER — LIDOCAINE HYDROCHLORIDE 10 MG/ML
INJECTION, SOLUTION INFILTRATION; PERINEURAL AS NEEDED
Status: DISCONTINUED | OUTPATIENT
Start: 2024-11-15 | End: 2024-11-15 | Stop reason: HOSPADM

## 2024-11-15 RX ORDER — SODIUM CHLORIDE 9 MG/ML
40 INJECTION, SOLUTION INTRAVENOUS AS NEEDED
Status: DISCONTINUED | OUTPATIENT
Start: 2024-11-15 | End: 2024-11-15 | Stop reason: HOSPADM

## 2024-11-15 RX ORDER — ONDANSETRON 2 MG/ML
INJECTION INTRAMUSCULAR; INTRAVENOUS AS NEEDED
Status: DISCONTINUED | OUTPATIENT
Start: 2024-11-15 | End: 2024-11-15 | Stop reason: SURG

## 2024-11-15 RX ORDER — ONDANSETRON 2 MG/ML
4 INJECTION INTRAMUSCULAR; INTRAVENOUS AS NEEDED
Status: DISCONTINUED | OUTPATIENT
Start: 2024-11-15 | End: 2024-11-15 | Stop reason: HOSPADM

## 2024-11-15 RX ORDER — ACETAMINOPHEN 325 MG/1
650 TABLET ORAL EVERY 4 HOURS PRN
Qty: 30 TABLET | Refills: 0 | Status: SHIPPED | OUTPATIENT
Start: 2024-11-15

## 2024-11-15 RX ORDER — KETOROLAC TROMETHAMINE 30 MG/ML
30 INJECTION, SOLUTION INTRAMUSCULAR; INTRAVENOUS EVERY 6 HOURS PRN
Status: DISCONTINUED | OUTPATIENT
Start: 2024-11-15 | End: 2024-11-15 | Stop reason: HOSPADM

## 2024-11-15 RX ORDER — IBUPROFEN 600 MG/1
600 TABLET, FILM COATED ORAL EVERY 6 HOURS PRN
Qty: 30 TABLET | Refills: 0 | Status: SHIPPED | OUTPATIENT
Start: 2024-11-15

## 2024-11-15 RX ORDER — FENTANYL CITRATE 50 UG/ML
INJECTION, SOLUTION INTRAMUSCULAR; INTRAVENOUS AS NEEDED
Status: DISCONTINUED | OUTPATIENT
Start: 2024-11-15 | End: 2024-11-15 | Stop reason: SURG

## 2024-11-15 RX ORDER — SODIUM CHLORIDE 0.9 % (FLUSH) 0.9 %
10 SYRINGE (ML) INJECTION EVERY 12 HOURS SCHEDULED
Status: DISCONTINUED | OUTPATIENT
Start: 2024-11-15 | End: 2024-11-15 | Stop reason: HOSPADM

## 2024-11-15 RX ORDER — PROPOFOL 10 MG/ML
INJECTION, EMULSION INTRAVENOUS AS NEEDED
Status: DISCONTINUED | OUTPATIENT
Start: 2024-11-15 | End: 2024-11-15 | Stop reason: SURG

## 2024-11-15 RX ORDER — IPRATROPIUM BROMIDE AND ALBUTEROL SULFATE 2.5; .5 MG/3ML; MG/3ML
3 SOLUTION RESPIRATORY (INHALATION) ONCE AS NEEDED
Status: DISCONTINUED | OUTPATIENT
Start: 2024-11-15 | End: 2024-11-15 | Stop reason: HOSPADM

## 2024-11-15 RX ORDER — MIDAZOLAM HYDROCHLORIDE 1 MG/ML
INJECTION, SOLUTION INTRAMUSCULAR; INTRAVENOUS AS NEEDED
Status: DISCONTINUED | OUTPATIENT
Start: 2024-11-15 | End: 2024-11-15 | Stop reason: SURG

## 2024-11-15 RX ORDER — MIDAZOLAM HYDROCHLORIDE 1 MG/ML
1 INJECTION, SOLUTION INTRAMUSCULAR; INTRAVENOUS
Status: DISCONTINUED | OUTPATIENT
Start: 2024-11-15 | End: 2024-11-15 | Stop reason: HOSPADM

## 2024-11-15 RX ORDER — DEXAMETHASONE SODIUM PHOSPHATE 4 MG/ML
INJECTION, SOLUTION INTRA-ARTICULAR; INTRALESIONAL; INTRAMUSCULAR; INTRAVENOUS; SOFT TISSUE AS NEEDED
Status: DISCONTINUED | OUTPATIENT
Start: 2024-11-15 | End: 2024-11-15 | Stop reason: SURG

## 2024-11-15 RX ORDER — MEPERIDINE HYDROCHLORIDE 25 MG/ML
12.5 INJECTION INTRAMUSCULAR; INTRAVENOUS; SUBCUTANEOUS
Status: DISCONTINUED | OUTPATIENT
Start: 2024-11-15 | End: 2024-11-15 | Stop reason: HOSPADM

## 2024-11-15 RX ORDER — HEPARIN SODIUM (PORCINE) LOCK FLUSH IV SOLN 100 UNIT/ML 100 UNIT/ML
SOLUTION INTRAVENOUS AS NEEDED
Status: DISCONTINUED | OUTPATIENT
Start: 2024-11-15 | End: 2024-11-15 | Stop reason: HOSPADM

## 2024-11-15 RX ORDER — SODIUM CHLORIDE 0.9 % (FLUSH) 0.9 %
10 SYRINGE (ML) INJECTION AS NEEDED
Status: DISCONTINUED | OUTPATIENT
Start: 2024-11-15 | End: 2024-11-15 | Stop reason: HOSPADM

## 2024-11-15 RX ORDER — FAMOTIDINE 10 MG/ML
INJECTION, SOLUTION INTRAVENOUS AS NEEDED
Status: DISCONTINUED | OUTPATIENT
Start: 2024-11-15 | End: 2024-11-15 | Stop reason: SURG

## 2024-11-15 RX ORDER — SODIUM CHLORIDE, SODIUM LACTATE, POTASSIUM CHLORIDE, CALCIUM CHLORIDE 600; 310; 30; 20 MG/100ML; MG/100ML; MG/100ML; MG/100ML
125 INJECTION, SOLUTION INTRAVENOUS ONCE
Status: DISCONTINUED | OUTPATIENT
Start: 2024-11-15 | End: 2024-11-15 | Stop reason: HOSPADM

## 2024-11-15 RX ORDER — FENTANYL CITRATE 50 UG/ML
50 INJECTION, SOLUTION INTRAMUSCULAR; INTRAVENOUS
Status: DISCONTINUED | OUTPATIENT
Start: 2024-11-15 | End: 2024-11-15 | Stop reason: HOSPADM

## 2024-11-15 RX ADMIN — FAMOTIDINE 20 MG: 10 INJECTION, SOLUTION INTRAVENOUS at 13:52

## 2024-11-15 RX ADMIN — FENTANYL CITRATE 100 MCG: 50 INJECTION INTRAMUSCULAR; INTRAVENOUS at 13:52

## 2024-11-15 RX ADMIN — CEFAZOLIN 2000 MG: 2 INJECTION, POWDER, FOR SOLUTION INTRAMUSCULAR; INTRAVENOUS at 13:52

## 2024-11-15 RX ADMIN — ONDANSETRON 4 MG: 2 INJECTION INTRAMUSCULAR; INTRAVENOUS at 13:52

## 2024-11-15 RX ADMIN — OXYCODONE HYDROCHLORIDE AND ACETAMINOPHEN 1 TABLET: 5; 325 TABLET ORAL at 14:39

## 2024-11-15 RX ADMIN — MIDAZOLAM HYDROCHLORIDE 2 MG: 1 INJECTION, SOLUTION INTRAMUSCULAR; INTRAVENOUS at 13:52

## 2024-11-15 RX ADMIN — DEXAMETHASONE SODIUM PHOSPHATE 4 MG: 4 INJECTION, SOLUTION INTRA-ARTICULAR; INTRALESIONAL; INTRAMUSCULAR; INTRAVENOUS; SOFT TISSUE at 14:00

## 2024-11-15 RX ADMIN — PROPOFOL 150 MG: 10 INJECTION, EMULSION INTRAVENOUS at 13:56

## 2024-11-15 NOTE — ANESTHESIA PREPROCEDURE EVALUATION
Anesthesia Evaluation     Patient summary reviewed and Nursing notes reviewed   no history of anesthetic complications:   NPO Solid Status: > 8 hours  NPO Liquid Status: > 8 hours           Airway   Mallampati: II  TM distance: >3 FB  Neck ROM: full  No difficulty expected  Dental - normal exam     Pulmonary - normal exam   (+) a smoker Former,  Cardiovascular - negative cardio ROS and normal exam        Neuro/Psych- negative ROS  GI/Hepatic/Renal/Endo - negative ROS     Musculoskeletal (-) negative ROS    Abdominal  - normal exam    Bowel sounds: normal.   Substance History - negative use     OB/GYN negative ob/gyn ROS         Other      history of cancer active        Phys Exam Other: Multiple caps             Anesthesia Plan    ASA 2     general     intravenous induction     Anesthetic plan, risks, benefits, and alternatives have been provided, discussed and informed consent has been obtained with: patient.    CODE STATUS:

## 2024-11-15 NOTE — OP NOTE
INSERTION OF PORTACATH  Procedure Note    Dali Woodruff  11/15/2024    Pre-op Diagnosis:   Malignant neoplasm of upper-outer quadrant of left breast in female, estrogen receptor positive [C50.412, Z17.0]    Post-op Diagnosis:     Post-Op Diagnosis Codes:     * Malignant neoplasm of upper-outer quadrant of left breast in female, estrogen receptor positive [C50.412, Z17.0]    Procedure(s):  INSERTION OF PORTACATH    Surgeon(s):  Micheal Hicks MD    Indication:  Breast cancer    Anesthesia: Choice    Staff:   Circulator: Ladi Severino RN  Radiology Technologist: Roger Chappell, RT  Scrub Person: Elbert Tinajero  Assistant: Yuliet Riddle    Operative Description: The patient was taken to the operating suite and placed supine on operating table.  Bilateral sequential compression devices were applied and anesthesia was administered.  The right neck and chest were prepped and draped in sterile fashion.  Timeout procedure was performed after antibiotics had been confirmed.   The right neck and chest were then infiltrated with local anesthetic.  Under ultrasound visualization an 18-gauge access needle was inserted into the right internal jugular vein under direct visualization.  Venous blood was aspirated.  Through the access needle guidewire was placed without resistance.  Ultrasound and fluoroscopy confirmed guidewire in appropriate position and course.  A stab incision at the guidewire entry site was made.  Two fingerbreadths below the right clavicle, a transverse incision was then made and with blunt and cautery dissection a subcutaneous pocket was created.  The catheter was then tunneled subcutaneously over the clavicle through the guidewire entry site in the right neck.  The catheter was cut to appropriate length based on patient height and the port was assembled and placed in the subcutaneous pocket.  Under fluoroscopic visualization the dilator introducer sheath was inserted via  Seldinger technique over the guidewire into the right internal jugular vein.  The guidewire and sheath were removed.  The catheter was inserted into the removable sheath and the sheath was removed.  Fluoroscopy showed the catheter tip in appropriate position with no evidence of kinking.  The port was accessed and withdrew venous blood easily and flushed with heparinized saline solution easily.  The port was secured to the pectoralis fascia with Prolene sutures.  The port site was closed with subcutaneous Vicryl and Monocryl subcuticular suture.  The right neck incision was closed with a subcuticular Monocryl suture.  The incisions were dressed with SureClose and the patient was awakened from anesthesia after all counts were correct and taken to recovery where stat chest x-ray was ordered confirming placement.    Estimated Blood Loss: 5mL    Specimens:   none                 Drains: none    Grafts/Implants: R IJ port    Findings: R IJ port withdrew and flushed    Complications: none      Micheal Hicks MD     Date: 11/15/2024  Time: 14:27 EST

## 2024-11-15 NOTE — ANESTHESIA PROCEDURE NOTES
Airway  Urgency: elective    Date/Time: 11/15/2024 1:57 PM  Airway not difficult    General Information and Staff    Patient location during procedure: OR  CRNA/CAA: Sylvester Morin CRNA    Indications and Patient Condition    Preoxygenated: yes  MILS not maintained throughout  Mask difficulty assessment: 0 - not attempted    Final Airway Details  Final airway type: supraglottic airway      Successful airway: unique  Size 3     Number of attempts at approach: 1  Assessment: lips, teeth, and gum same as pre-op and atraumatic intubation    Additional Comments  Atraumatic LMA placement, dentition unchanged.

## 2024-11-22 NOTE — H&P
ubjective  Dali Woodruff is a 49 y.o. female who presents today for Initial Evaluation     Chief Complaint:        Chief Complaint   Patient presents with    port placement         History of Present Illness:    History of Present Illness Dali is a 49-year-old female who presents for evaluation for port placement.  She was recently diagnosed with malignant neoplasm of the upper outer quadrant of her left breast.  She states that she has been evaluated by oncology at our facility.  She has an appointment with breast surgery at  this Thursday and oncology at  the following Thursday.  She reports that she will undergo chemotherapy here at our facility.  Patient states that she was evaluated by Dr. Hicks approximately 10 years ago for lymphadenopathy in her inguinal area.  She reports that approximately 3 months later that lymph node return to normal size.  However, she states that it occasionally will become enlarged and she is slightly concerned that she can feel it again.  She does report a history of a partial hysterectomy in the past.  She is not on any blood thinning medications.     The following portions of the patient's history were reviewed and updated as appropriate: allergies, current medications, past family history, past medical history, past social history, past surgical history and problem list.     Past Medical History:  Medical History        Past Medical History:   Diagnosis Date    Urinary tract infection              Social History:  Social History   Social History            Socioeconomic History    Marital status:    Tobacco Use    Smoking status: Former       Current packs/day: 0.00       Types: Cigarettes       Quit date:        Years since quittin.8       Passive exposure: Past    Smokeless tobacco: Never   Vaping Use    Vaping status: Never Used   Substance and Sexual Activity    Alcohol use: No    Drug use: No    Sexual activity: Defer       Birth control/protection:  Surgical       Comment: , has 4 children, homemaker takes care of her 23 year old handicapped son            Family History:        Family History   Problem Relation Age of Onset    Cancer Brother      Cancer Maternal Grandmother      Diabetes Neg Hx      Heart disease Neg Hx      Hypertension Neg Hx           Past Surgical History:  Surgical History         Past Surgical History:   Procedure Laterality Date     SECTION        male     SECTION        male     SECTION   2004     female    HYSTERECTOMY   2012     partial, repair on urethra    VAGINAL DELIVERY        male            Problem List:  Problem List       Patient Active Problem List   Diagnosis    Lymphadenopathy    Chronic cystitis    Malignant neoplasm of upper-outer quadrant of left breast in female, estrogen receptor positive            Allergy:   Allergies   No Known Allergies         Current Medications:   Current Medications          Current Outpatient Medications   Medication Sig Dispense Refill    Multiple Vitamin (DAILY VITAMINS PO) Take  by mouth.          No current facility-administered medications for this visit.            Review of Systems:    Review of Systems  12 point review of system was performed and apart from noted breast mass of the left breast all other systems negative.     Physical Exam:   Physical Exam  Constitutional:       Appearance: Normal appearance.   HENT:      Head: Normocephalic and atraumatic.      Right Ear: External ear normal.      Left Ear: External ear normal.   Eyes:      Conjunctiva/sclera: Conjunctivae normal.   Pulmonary:      Effort: Pulmonary effort is normal.   Abdominal:      General: Abdomen is flat.   Musculoskeletal:         General: Normal range of motion.      Cervical back: Normal range of motion and neck supple.   Skin:     General: Skin is warm and dry.      Capillary Refill: Capillary refill takes less than 2 seconds.   Neurological:      General: No focal  "deficit present.      Mental Status: She is alert and oriented to person, place, and time.   Psychiatric:         Mood and Affect: Mood normal.         Behavior: Behavior normal.            Vitals:  Blood pressure 100/68, height 167.6 cm (65.98\"), weight 61.7 kg (136 lb).   Body mass index is 21.96 kg/m².          Assessment & Plan  Diagnoses and all orders for this visit:     1. Lymphadenopathy (Primary)  -     US Soft Tissue; Future     2. Malignant neoplasm of upper-outer quadrant of left breast in female, estrogen receptor positive  -     Case Request; Standing  -     sodium chloride 0.9 % flush 10 mL  -     sodium chloride 0.9 % flush 10 mL  -     ceFAZolin 2000 mg IVPB in 100 mL NS (VTB)  -     Case Request     Other orders  -     Follow Anesthesia Guidelines / Protocol; Future  -     Follow Anesthesia Guidelines / Protocol; Standing  -     Verify / Perform Chlorhexidine Skin Prep; Standing  -     Provide NPO Instructions to Patient; Future  -     Chlorhexidine Skin Prep; Future  -     Insert Peripheral IV; Standing  -     Saline Lock & Maintain IV Access; Standing  -     Place Sequential Compression Device; Standing  -     Maintain Sequential Compression Device; Standing     Dali is a 49-year-old female who presents for evaluation for port placement.  She undergo port placement with Dr. Hicks this week.  Verbalized understanding of prep instructions and procedure wished to proceed.  Have ordered an ultrasound of her inguinal area to further evaluate lymphadenopathy.     Visit Diagnoses:  Visit Diagnosis       ICD-10-CM ICD-9-CM   1. Lymphadenopathy  R59.1 785.6   2. Malignant neoplasm of upper-outer quadrant of left breast in female, estrogen receptor positive  C50.412 174.4     Z17.0 V86.0               MEDS ORDERED DURING VISIT:  No orders of the defined types were placed in this encounter.        Return if symptoms worsen or fail to improve.  "

## 2025-03-10 ENCOUNTER — TRANSCRIBE ORDERS (OUTPATIENT)
Dept: ADMINISTRATIVE | Facility: HOSPITAL | Age: 50
End: 2025-03-10
Payer: COMMERCIAL

## 2025-03-10 DIAGNOSIS — Z15.09 GENETIC SUSCEPTIBILITY TO OTHER MALIGNANT NEOPLASM: ICD-10-CM

## 2025-03-10 DIAGNOSIS — Z15.01 GENETIC SUSCEPTIBILITY TO MALIGNANT NEOPLASM OF BREAST: Primary | ICD-10-CM

## 2025-05-28 ENCOUNTER — TRANSCRIBE ORDERS (OUTPATIENT)
Dept: ADMINISTRATIVE | Facility: HOSPITAL | Age: 50
End: 2025-05-28
Payer: COMMERCIAL

## 2025-05-28 DIAGNOSIS — H53.2 DIPLOPIA: Primary | ICD-10-CM

## 2025-05-28 DIAGNOSIS — H49.20 ABDUCENS NERVE PALSY, UNSPECIFIED LATERALITY: ICD-10-CM

## 2025-06-02 ENCOUNTER — TRANSCRIBE ORDERS (OUTPATIENT)
Dept: ADMINISTRATIVE | Facility: HOSPITAL | Age: 50
End: 2025-06-02
Payer: COMMERCIAL

## 2025-06-02 DIAGNOSIS — G58.9 NERVE PALSY: Primary | ICD-10-CM

## 2025-06-02 DIAGNOSIS — H53.2 DIPLOPIA: ICD-10-CM

## 2025-06-03 ENCOUNTER — TRANSCRIBE ORDERS (OUTPATIENT)
Dept: ADMINISTRATIVE | Facility: HOSPITAL | Age: 50
End: 2025-06-03
Payer: COMMERCIAL

## 2025-06-03 ENCOUNTER — HOSPITAL ENCOUNTER (OUTPATIENT)
Facility: HOSPITAL | Age: 50
Discharge: HOME | End: 2025-06-03
Admitting: INTERNAL MEDICINE
Payer: COMMERCIAL

## 2025-06-03 DIAGNOSIS — G44.52 NEW DAILY PERSISTENT HEADACHE: Primary | ICD-10-CM

## 2025-06-03 DIAGNOSIS — G44.52 NEW DAILY PERSISTENT HEADACHE: ICD-10-CM

## 2025-06-03 PROCEDURE — 70450 CT HEAD/BRAIN W/O DYE: CPT

## 2025-06-03 PROCEDURE — 70450 CT HEAD/BRAIN W/O DYE: CPT | Performed by: RADIOLOGY

## (undated) DEVICE — DRP C/ARM W/BAND W/CLIPS 41X74IN

## (undated) DEVICE — GLV SURG PREMIERPRO MIC LTX PF SZ7.5 BRN

## (undated) DEVICE — NDL HYPO ECLPS SFTY 18G 1 1/2IN

## (undated) DEVICE — SYR LUERLOK 30CC

## (undated) DEVICE — UNDERGLV SURG BIOGEL INDICAT PF 8 GRN

## (undated) DEVICE — SUT VIC 3/0 SH 27IN J416H

## (undated) DEVICE — DRAPE,T,LAPARO,TRANS,STERILE: Brand: MEDLINE

## (undated) DEVICE — PK BASIC 70

## (undated) DEVICE — PATIENT RETURN ELECTRODE, SINGLE-USE, CONTACT QUALITY MONITORING, ADULT, WITH 9FT CORD, FOR PATIENTS WEIGING OVER 33LBS. (15KG): Brand: MEGADYNE

## (undated) DEVICE — PENCL SMOKE/EVAC MEGADYNE TELESCP 10FT

## (undated) DEVICE — HOLDER: Brand: DEROYAL

## (undated) DEVICE — SUT MNCRYL PLS ANTIB UD 4/0 PS2 18IN

## (undated) DEVICE — INTENDED FOR TISSUE SEPARATION, AND OTHER PROCEDURES THAT REQUIRE A SHARP SURGICAL BLADE TO PUNCTURE OR CUT.: Brand: BARD-PARKER ® CARBON RIB-BACK BLADES

## (undated) DEVICE — SUT PROLN 3/0 8832H

## (undated) DEVICE — DRAPE,UTILTY,TAPE,15X26, 4EA/PK: Brand: MEDLINE

## (undated) DEVICE — HYPODERMIC SAFETY NEEDLE: Brand: MONOJECT

## (undated) DEVICE — CVR PROB ULTRASND CIVFLEX GEN/PURP TELESCP/FOLD 5.5X58IN LF

## (undated) DEVICE — ELECTRD BLD EZ CLN MOD XLNG 2.75IN